# Patient Record
Sex: MALE | Race: WHITE | URBAN - METROPOLITAN AREA
[De-identification: names, ages, dates, MRNs, and addresses within clinical notes are randomized per-mention and may not be internally consistent; named-entity substitution may affect disease eponyms.]

---

## 2017-02-20 ENCOUNTER — ONCOLOGY VISIT (OUTPATIENT)
Dept: TRANSPLANT | Facility: CLINIC | Age: 32
End: 2017-02-20
Attending: INTERNAL MEDICINE
Payer: COMMERCIAL

## 2017-02-20 VITALS
HEART RATE: 98 BPM | OXYGEN SATURATION: 96 % | DIASTOLIC BLOOD PRESSURE: 89 MMHG | SYSTOLIC BLOOD PRESSURE: 136 MMHG | TEMPERATURE: 97.4 F | WEIGHT: 242.4 LBS | RESPIRATION RATE: 18 BRPM | BODY MASS INDEX: 36.86 KG/M2

## 2017-02-20 DIAGNOSIS — C92.01 ACUTE MYELOID LEUKEMIA IN REMISSION (H): ICD-10-CM

## 2017-02-20 DIAGNOSIS — I10 ESSENTIAL HYPERTENSION, BENIGN: ICD-10-CM

## 2017-02-20 LAB
ALBUMIN SERPL-MCNC: 3.9 G/DL (ref 3.4–5)
ALP SERPL-CCNC: 78 U/L (ref 40–150)
ALT SERPL W P-5'-P-CCNC: 100 U/L (ref 0–70)
ANION GAP SERPL CALCULATED.3IONS-SCNC: 12 MMOL/L (ref 3–14)
AST SERPL W P-5'-P-CCNC: 35 U/L (ref 0–45)
BASOPHILS # BLD AUTO: 0 10E9/L (ref 0–0.2)
BASOPHILS NFR BLD AUTO: 0.4 %
BILIRUB SERPL-MCNC: 0.5 MG/DL (ref 0.2–1.3)
BUN SERPL-MCNC: 10 MG/DL (ref 7–30)
CALCIUM SERPL-MCNC: 9.4 MG/DL (ref 8.5–10.1)
CHLORIDE SERPL-SCNC: 108 MMOL/L (ref 94–109)
CO2 SERPL-SCNC: 22 MMOL/L (ref 20–32)
CREAT SERPL-MCNC: 1.05 MG/DL (ref 0.66–1.25)
DIFFERENTIAL METHOD BLD: ABNORMAL
EOSINOPHIL # BLD AUTO: 0.1 10E9/L (ref 0–0.7)
EOSINOPHIL NFR BLD AUTO: 2.3 %
ERYTHROCYTE [DISTWIDTH] IN BLOOD BY AUTOMATED COUNT: 11.2 % (ref 10–15)
GFR SERPL CREATININE-BSD FRML MDRD: 83 ML/MIN/1.7M2
GLUCOSE SERPL-MCNC: 99 MG/DL (ref 70–99)
HCT VFR BLD AUTO: 46.3 % (ref 40–53)
HGB BLD-MCNC: 17 G/DL (ref 13.3–17.7)
IMM GRANULOCYTES # BLD: 0 10E9/L (ref 0–0.4)
IMM GRANULOCYTES NFR BLD: 0.4 %
LYMPHOCYTES # BLD AUTO: 2.7 10E9/L (ref 0.8–5.3)
LYMPHOCYTES NFR BLD AUTO: 47.6 %
MCH RBC QN AUTO: 33.6 PG (ref 26.5–33)
MCHC RBC AUTO-ENTMCNC: 36.7 G/DL (ref 31.5–36.5)
MCV RBC AUTO: 92 FL (ref 78–100)
MONOCYTES # BLD AUTO: 0.5 10E9/L (ref 0–1.3)
MONOCYTES NFR BLD AUTO: 9.3 %
NEUTROPHILS # BLD AUTO: 2.2 10E9/L (ref 1.6–8.3)
NEUTROPHILS NFR BLD AUTO: 40 %
NRBC # BLD AUTO: 0 10*3/UL
NRBC BLD AUTO-RTO: 0 /100
PLATELET # BLD AUTO: 326 10E9/L (ref 150–450)
POTASSIUM SERPL-SCNC: 3.8 MMOL/L (ref 3.4–5.3)
PROT SERPL-MCNC: 7.4 G/DL (ref 6.8–8.8)
RBC # BLD AUTO: 5.06 10E12/L (ref 4.4–5.9)
SODIUM SERPL-SCNC: 143 MMOL/L (ref 133–144)
WBC # BLD AUTO: 5.6 10E9/L (ref 4–11)

## 2017-02-20 PROCEDURE — 80053 COMPREHEN METABOLIC PANEL: CPT | Performed by: INTERNAL MEDICINE

## 2017-02-20 PROCEDURE — 99212 OFFICE O/P EST SF 10 MIN: CPT | Mod: ZF

## 2017-02-20 PROCEDURE — 36415 COLL VENOUS BLD VENIPUNCTURE: CPT

## 2017-02-20 PROCEDURE — 85025 COMPLETE CBC W/AUTO DIFF WBC: CPT | Performed by: INTERNAL MEDICINE

## 2017-02-20 NOTE — NURSING NOTE
BMT Heme Malignancy Rooming Note    Roel Alejandro - 2/20/2017 11:29 AM     Chief Complaint   Patient presents with     RECHECK     AML        /89 (BP Location: Right arm, Cuff Size: Adult Large)  Pulse 98  Temp 97.4  F (36.3  C) (Tympanic)  Resp 18  Wt 110 kg (242 lb 6.4 oz)  SpO2 96%  BMI 36.86 kg/m2     Medications reviewed: Yes    Labs drawn: No    Dressing changed: No     Medications given: No    Staff time:6    Additional information if applicable: n/a    HUMBLE VALLES CMA

## 2017-02-20 NOTE — PATIENT INSTRUCTIONS
Pt states he will schedule his own derm appt outside and he claims he doesn't need to come back here next week,lg

## 2017-02-20 NOTE — MR AVS SNAPSHOT
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  After hours call the   hospital at [phone] or   [phone]. Ask for the BMT   fellow for pediatric or adult patients           Care Instructions    Pt states he will schedule his own derm appt outside and he claims he doesn't need to come back here next week,lg        Follow-ups after your visit        Additional Services     Dermatology Referral       Your provider has referred you to: he ahs new nevi in the L arm, please eval    Please be aware that coverage of these services is subject to the terms and limitations of your health insurance plan.  Call member services at your health plan with any benefit or coverage questions.      Please bring the following with you to your appointment:    (1) Any X-Rays, CTs or MRIs which have been performed.  Contact the facility where they were done to arrange for  prior to your scheduled appointment.    (2) List of current medications  (3) This referral request   (4) Any documents/labs given to you for this referral                  Follow-up notes from your care team     Return in about 9 days (around 3/1/2017).      Your next 10 appointments already scheduled     Mar 15, 2017  1:30 PM T   [facility] Draw with  [facility]   [facility] Draw ([facility])    [address]  [facility]  [facility] [id number] [phone]            Mar 20, 2017  9:00 AM CDT   BMT Anniversary Visit with Chapo Mann MD   [facility] Blood and Marrow Transplant ([facility])    [address]  [facility] [facility] [id number] [phone]              Future tests that were ordered for you today     Open Future Orders        Priority Expected Expires Ordered    Dermatology Referral Routine 3/1/2017 2/20/2018 2/20/2017            Who to contact     If you have questions or need follow up information about today's clinic visit or your schedule please contact [facility] BLOOD AND MARROW TRANSPLANT directly at [phone].  Normal or non-critical lab and imaging results will be communicated to you by OleOlehart, letter or phone within 4 business days after the clinic has received the results. If you do not hear from us within 7 days, please contact the clinic through OleOlehart or phone. If you have a critical or abnormal lab result, we will notify you by phone as soon as possible.  Submit refill requests through citysocializer or call your pharmacy and they will forward the refill request to us. Please allow 3 business days for your refill to be completed.          Additional Information About Your Visit        citysocializer Information     citysocializer gives you secure access to your electronic health record. If you see a primary care provider, you can also send messages to your care team and make appointments. If you have questions, please call your primary care clinic.  If you do not have a primary care provider, please call [phone] and they will assist you.        Care EveryWhere ID     This is your Care EveryWhere ID. This could be used by other organizations to access your [facility] medical records  [id number]        Your Vitals Were     Pulse Temperature Respirations Pulse Oximetry BMI (Body Mass Index)       98 97.4  F (36.3  C) (Tympanic) 18 96% 36.86 kg/m2         Blood Pressure from Last 3 Encounters:   02/20/17 136/89   12/12/16 (!) 144/91   09/19/16 126/77    Weight from Last 3 Encounters:   02/20/17 110 kg (242 lb 6.4 oz)   12/12/16 111.3 kg (245 lb 4.8 oz)   09/19/16 107.9 kg (237 lb 14 oz)              We Performed the Following     CBC with platelets differential     Comprehensive metabolic panel          Today's Medication Changes          These changes are accurate as of: 2/20/17  3:33 PM.  If you have any questions, ask your nurse or doctor.               These medicines have changed or have updated prescriptions.        Dose/Directions    amLODIPine 5 MG tablet   Commonly known as:  NORVASC   This may have changed:  Another medication with the same name was removed. Continue taking this medication, and follow the directions you see here.   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), Essential hypertension, benign   Changed by:  Chapo Mann MD        Dose:  5 mg   Take 1 tablet (5 mg) by mouth daily   Quantity:  90 tablet   Refills:  3                Recent Review Flowsheet Data     BMT Recent Results Latest Ref Rng & Units 5/9/2016 6/20/2016 7/13/2016 8/8/2016 9/19/2016 12/12/2016 2/20/2017    WBC 4.0 - 11.0 10e9/L 4.3 4.9 9.1 4.3 4.5 4.9 5.6    Hemoglobin 13.3 - 17.7 g/dL 15.7 15.5 15.0 15.8 15.6 16.7 17.0    Platelet Count 150 - 450 10e9/L 239 234 272 226 258 252 326    Neutrophils (Absolute) 1.6 - 8.3 10e9/L 1.8 2.0 5.5 1.7 1.8 1.8 2.2    Sodium 133 - 144 mmol/L 141 138 139 140 141 140 143    Potassium 3.4 - 5.3 mmol/L 3.9 3.4 3.3(L) 3.6 3.5 3.7 3.8    Chloride 94 - 109 mmol/L 107 106 105 105 108 107 108    Glucose 70 - 99 mg/dL 85 112(H) 86 110(H) 95 119(H) 99    Urea Nitrogen 7 - 30 mg/dL 9 14 10 8 12 12 10    Creatinine 0.66 - 1.25 mg/dL 0.95 0.96 1.00 1.10 0.95 1.04 1.05    Calcium (Total) 8.5 - 10.1 mg/dL 9.3 9.0 9.4 9.4 9.2 9.5 9.4    Protein (Total) 6.8 - 8.8 g/dL 7.2 6.9 7.4 7.1 6.8 7.2 7.4    Albumin 3.4 - 5.0 g/dL 3.8 3.8 4.0 3.9  3.9 3.9 3.9    Alkaline Phosphatase 40 - 150 U/L 99 78 85 79 64 70 78    AST 0 - 45 U/L 35 40 36 43 37 52(H) 35    ALT 0 - 70 U/L 55 70 90(H) 101(H) 80(H) 104(H) 100(H)    MCV 78 - 100 fl 95 95 95 96 95 93 92               Primary Care Provider Office Phone # Fax #    Chapo Mann [phone]        [facility] [address] [facility] [address]  [facility] [address]        Thank you!     Thank you for choosing [facility] BLOOD AND MARROW TRANSPLANT  for your care. Our goal is always to provide you with excellent care. Hearing back from our patients is one way we can continue to improve our services. Please take a few minutes to complete the written survey that you may receive in the mail after your visit with us. Thank you!             Your Updated Medication List - Protect others around you: Learn how to safely use, store and throw away your medicines at www.disposemymeds.org.          This list is accurate as of: 2/20/17  3:33 PM.  Always use your most recent med list.                   Brand Name Dispense Instructions for use    amLODIPine 5 MG tablet    NORVASC    90 tablet    Take 1 tablet (5 mg) by mouth daily       busPIRone 15 MG tablet    BUSPAR    90 tablet    Take 0.5 tablets (7.5 mg) by mouth 2 times daily

## 2017-02-20 NOTE — NURSING NOTE
Chief Complaint   Patient presents with     Blood Draw     Labs only     Vitals and labs done peripherally.  See doc flow sheets for details.  Samanta Mccall CMA

## 2017-02-20 NOTE — PROGRESS NOTES
BMT Clinic Progress Note    Mr. Alejandro is a 29 yo man Day +636 s/p MA 8/8 allosib PBSCT for AML. Protocol 2001-02.     HPI:  HPI: He is doing fine.His oral intake is fine. Denied nausea/vomiting,diarrhea/constipation, cough/sputum, dyspnea, fever/chills, abdominal/chest pain, headache/lightheadness, His energy level is OK.       ROS: 10 point otherwise unremarkable other than what is noted in the HPI.     Physical Exam:  Blood pressure 136/89, pulse 98, temperature 97.4  F (36.3  C), temperature source Tympanic, resp. rate 18, weight 110 kg (242 lb 6.4 oz), SpO2 96 %.    General: A&O. NAD.  HEENT: ION. EOMI. Sclera anicteric; OP moist without lesions.  No erythema of posterior o/p.  No exudate.    Lungs: CTAB  Heart: RRR, no m/r/g  Abdomen: normoactive bs, soft, NT, ND  Extremities: no pedal edema  Skin: No rash.   Neurologic: Grossly nonfocal    Labs:  Lab Results   Component Value Date    WBC 5.6 02/20/2017    ANEU 2.2 02/20/2017    HGB 17.0 02/20/2017    HCT 46.3 02/20/2017     02/20/2017     02/20/2017    POTASSIUM 3.8 02/20/2017    CHLORIDE 108 02/20/2017    CO2 22 02/20/2017    GLC 99 02/20/2017    BUN 10 02/20/2017    CR 1.05 02/20/2017    MAG 1.8 01/11/2016    INR 1.03 06/09/2015   AST       43   8/8/2016  ALT      101   8/8/2016    Last Basic Metabolic Panel:  Lab Results   Component Value Date     02/20/2017      Lab Results   Component Value Date    POTASSIUM 3.8 02/20/2017     Lab Results   Component Value Date    CHLORIDE 108 02/20/2017     Lab Results   Component Value Date    ESPERANZA 9.4 02/20/2017     Lab Results   Component Value Date    CO2 22 02/20/2017     Lab Results   Component Value Date    BUN 10 02/20/2017     Lab Results   Component Value Date    CR 1.05 02/20/2017     Lab Results   Component Value Date    GLC 99 02/20/2017           1-YEAR ANNIVERSARY  FINAL DIAGNOSIS:  Bone marrow, posterior iliac crest, right decalcified trephine biopsy  and touch imprint; right  direct aspirate smear, and concentrated  aspirate smear; and peripheral blood smear:  - No morphologic or immunophenotypic evidence of acute myeloid  leukemia (see comment)  - Hypocellular bone marrow for age (20-30%) with trilineage process  and no increase in blasts  - Peripheral blood showing slight leukopenia; slight neutropenia   INTERPRETATION:  Bone marrow, Right:  No increase in myeloid blasts and no abnormal myeloid blast  population    COMMENT:  Final interpretation requires correlation with morphology and other  ancillary studies.    RESULTS:  Percentages reported below are based on the total number of CD45  positive viable leukocytes. If applicable, percentage of plasma cells is  from total viable nucleated cells.  1% cells in the blast gate (CD45 dim and low side scatter blast gate).  There is no aberrant immunophenotype on the myeloid blasts.  0.7% CD34 positive blasts  100% DONOR IN BM  Assessment/Plan: Mr. Alejandro is a 29 yo man ,  s/p MA 8/8 allosib PBSCT for AML. Protocol 2001-02. 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    1. AML/BMT:   - D+21, Day+100 bone marrow biopsy Show remission by morphology and flow. Chimerism 100% donor other then 87% donor in CD 3 compartment (previously 86% donor).   - D+180, and 360 bmbx , he is In CR byt CD3 PB is not 100% donor. Started tapering CSA on 9/21/15;     D/C BM biopsy at year 2, he really does not need it given his perfect CBC.    2. HEME: Keep Hgb>8g/dL and plts>30K (h/o retinal hemorrhage and h/o epistaxis). Benadryl/tylenol premeds. Not needing transfusions    3. ID: Afebrile, no active infections. CMV (+), HSV (-), EBV (+). CMV neg (8/28).  - PPx: D/c fluconzole given LFTs upper limit of normal. Decrease acyclovir to 800 mg BID from five times daily. D/c bactrium, give pentamidine today.   CMV was negative on 1/4/2016.  Pentamidin compelted  2nd set of immunisation done , last one in may 2017  4. GVHD: No active GVH. Off immunosuppressive  drugs.  ALT is still elvatd, but mild, other tests are normal, I explained about ETOH, sometime my perosnal experience ETOH can be unusually cause sig irritations, perhaps GVHD flares in the liver?    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 No evidence viral inclusions, CMV stains neg.   - Started steroids on 6/4 and has now tapered off, last day 8/15/15.   -  started tapering off 9/21/2015; completed    [12/21/2015, LFT elevation with plt count drifting down while on CSA 25 mg daily, so CSA dose was increased back to 25 mg po BID. Patient currently on 25 mg po BID, and doing well with this.  LFTs are within normal range, but are on upper limit of normal.  Therefore, will not taper CSA today.  Will have him continue on 25 mg po BID. 25 mg po BID and 25 QD alternatively (2/29/16); on CSA 25 mg daily  (3/2016); CSA 25 mg QOD (6/20/2016) started tapering off 9/21/2015; completed; D/C (8/8/16)  D/c fluc, D/c'd bactrim (give pentamidine today, last dose]  decreased acyclovir to 800 mg PO BID (from five times daily); D/C on 8/8/16    5. GI: ALT is slightly elevated, but better, just follow in 2 mos    - GI prophy: Protonix..  - h/o rectal abscess ' S/p recent surgery his pain is improved    6. FEN/Renal: WNL  - WNL    7. CV: Hypertension, he gained weight as well. Amlodipin was increased to 5 mg daily (12/12/2016)      RTC when anniversary is, D/C BM biopsy at 2 year, Ok to d other test, chimerism from PB    Chapo Mann MD

## 2017-03-15 DIAGNOSIS — C92.00 ACUTE MYELOID LEUKEMIA (H): Primary | ICD-10-CM

## 2017-03-15 LAB
ALBUMIN SERPL-MCNC: 3.8 G/DL (ref 3.4–5)
ALP SERPL-CCNC: 69 U/L (ref 40–150)
ALT SERPL W P-5'-P-CCNC: 84 U/L (ref 0–70)
ANION GAP SERPL CALCULATED.3IONS-SCNC: 8 MMOL/L (ref 3–14)
AST SERPL W P-5'-P-CCNC: 32 U/L (ref 0–45)
BASOPHILS # BLD AUTO: 0 10E9/L (ref 0–0.2)
BASOPHILS NFR BLD AUTO: 0.6 %
BILIRUB SERPL-MCNC: 0.5 MG/DL (ref 0.2–1.3)
BUN SERPL-MCNC: 9 MG/DL (ref 7–30)
CALCIUM SERPL-MCNC: 9 MG/DL (ref 8.5–10.1)
CHLORIDE SERPL-SCNC: 109 MMOL/L (ref 94–109)
CO2 SERPL-SCNC: 26 MMOL/L (ref 20–32)
CREAT SERPL-MCNC: 0.97 MG/DL (ref 0.66–1.25)
DIFFERENTIAL METHOD BLD: NORMAL
EOSINOPHIL # BLD AUTO: 0.1 10E9/L (ref 0–0.7)
EOSINOPHIL NFR BLD AUTO: 2.9 %
ERYTHROCYTE [DISTWIDTH] IN BLOOD BY AUTOMATED COUNT: 11.5 % (ref 10–15)
GFR SERPL CREATININE-BSD FRML MDRD: ABNORMAL ML/MIN/1.7M2
GLUCOSE SERPL-MCNC: 102 MG/DL (ref 70–99)
HCT VFR BLD AUTO: 45.5 % (ref 40–53)
HGB BLD-MCNC: 16.6 G/DL (ref 13.3–17.7)
IMM GRANULOCYTES # BLD: 0 10E9/L (ref 0–0.4)
IMM GRANULOCYTES NFR BLD: 0.4 %
LYMPHOCYTES # BLD AUTO: 2.2 10E9/L (ref 0.8–5.3)
LYMPHOCYTES NFR BLD AUTO: 46.2 %
MCH RBC QN AUTO: 32.9 PG (ref 26.5–33)
MCHC RBC AUTO-ENTMCNC: 36.5 G/DL (ref 31.5–36.5)
MCV RBC AUTO: 90 FL (ref 78–100)
MONOCYTES # BLD AUTO: 0.5 10E9/L (ref 0–1.3)
MONOCYTES NFR BLD AUTO: 9.5 %
NEUTROPHILS # BLD AUTO: 2 10E9/L (ref 1.6–8.3)
NEUTROPHILS NFR BLD AUTO: 40.4 %
NRBC # BLD AUTO: 0 10*3/UL
NRBC BLD AUTO-RTO: 0 /100
PLATELET # BLD AUTO: 258 10E9/L (ref 150–450)
POTASSIUM SERPL-SCNC: 3.7 MMOL/L (ref 3.4–5.3)
PROT SERPL-MCNC: 7 G/DL (ref 6.8–8.8)
RBC # BLD AUTO: 5.04 10E12/L (ref 4.4–5.9)
SODIUM SERPL-SCNC: 142 MMOL/L (ref 133–144)
WBC # BLD AUTO: 4.8 10E9/L (ref 4–11)

## 2017-03-15 PROCEDURE — 85025 COMPLETE CBC W/AUTO DIFF WBC: CPT | Performed by: INTERNAL MEDICINE

## 2017-03-15 PROCEDURE — 80053 COMPREHEN METABOLIC PANEL: CPT | Performed by: INTERNAL MEDICINE

## 2017-03-15 PROCEDURE — 81268 CHIMERISM ANAL W/CELL SELECT: CPT | Performed by: INTERNAL MEDICINE

## 2017-03-15 NOTE — NURSING NOTE
Chief Complaint   Patient presents with     Lab Only     patient here with AML - here for labs only for 2 year post transplant   labs drawn peripherally from left arm

## 2017-03-17 LAB
COPATH REPORT: NORMAL
COPATH REPORT: NORMAL

## 2017-03-20 ENCOUNTER — OFFICE VISIT (OUTPATIENT)
Dept: TRANSPLANT | Facility: CLINIC | Age: 32
End: 2017-03-20
Attending: INTERNAL MEDICINE
Payer: COMMERCIAL

## 2017-03-20 VITALS
BODY MASS INDEX: 37.68 KG/M2 | HEART RATE: 82 BPM | DIASTOLIC BLOOD PRESSURE: 96 MMHG | WEIGHT: 247.8 LBS | SYSTOLIC BLOOD PRESSURE: 145 MMHG | TEMPERATURE: 98.1 F | OXYGEN SATURATION: 96 % | RESPIRATION RATE: 20 BRPM

## 2017-03-20 DIAGNOSIS — C92.01 ACUTE MYELOID LEUKEMIA IN REMISSION (H): Primary | ICD-10-CM

## 2017-03-20 PROCEDURE — 99212 OFFICE O/P EST SF 10 MIN: CPT | Mod: ZF

## 2017-03-20 ASSESSMENT — PAIN SCALES - GENERAL: PAINLEVEL: NO PAIN (0)

## 2017-03-20 NOTE — MR AVS SNAPSHOT
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 Center ([facility])  [address] [place] [address]  Phone: [phone]  Clinic Hours:   Monday-Friday:    8am to 4:30pm   Weekends and holidays:    8am to noon (in general)  If your fever is 100.5  or greater,   call the clinic.  After hours call the   hospital at [phone] or   [phone]. Ask for the BMT   fellow for pediatric or adult patients           Care Instructions    5/1/17 @ 11a        Follow-ups after your visit        Additional Services     GASTROENTEROLOGY ADULT REF CONSULT ONLY       Preferred Location: [facility]      Please be aware that coverage of these services is subject to the terms and limitations of your health insurance plan.  Call member services at your health plan with any benefit or coverage questions.  Any procedures must be performed at a [place] facility OR coordinated by your clinic's referral office.    Please bring the following with you to your appointment:    (1) Any X-Rays, CTs or MRIs which have been performed.  Contact the facility where they were done to arrange for  prior to your scheduled appointment.    (2) List of current medications   (3) This referral request   (4) Any documents/labs given to you for this referral                  Follow-up notes from your care team     Return in about 6 weeks (around 5/1/2017) for Lab Work, Routine Visit.      Your next 10 appointments already scheduled     May 01, 2017 11:30 AM CDT   Return with Chapo Mann MD   [facility] Blood and Marrow Transplant ([facility])    [address] [place] [address]  [facility] [address] [phone]              Future tests that were ordered for you today     Open Future Orders        Priority Expected Expires Ordered    GASTROENTEROLOGY ADULT REF CONSULT ONLY Routine 5/1/2017 3/20/2018 3/20/2017    CBC with platelets differential Routine 5/1/2017 3/20/2018 3/20/2017    Comprehensive metabolic panel Routine 5/1/2017 3/20/2018 3/20/2017            Who to contact     If you have questions or need follow up information about today's clinic visit or your schedule please contact [facility] BLOOD AND MARROW TRANSPLANT directly at [phone].  Normal or non-critical lab and imaging results will be communicated to you by Placelinghart, letter or phone within 4 business days after the clinic has received the results. If you do not hear from us within 7 days, please contact the clinic through Bplatst or phone. If you have a critical or abnormal lab result, we will notify you by phone as soon as possible.  Submit refill requests through Fantastec or call your pharmacy and they will forward the refill request to us. Please allow 3 business days for your refill to be completed.          Additional Information About Your Visit        Placelinghart Information     Fantastec gives you secure access to your electronic health record. If you see a primary care provider, you can also send messages to your care team and make appointments. If you have questions, please call your primary care clinic.  If you do not have a primary care provider, please call [phone] and they will assist you.        Care EveryWhere ID     This is your Care EveryWhere ID. This could be used by other organizations to access your [facility] medical records  [id number]        Your Vitals Were     Pulse Temperature Respirations Pulse Oximetry BMI (Body Mass Index)       82 98.1  F (36.7  C) (Oral) 20 96% 37.68 kg/m2        Blood Pressure from Last 3 Encounters:   03/20/17 (!) 145/96   02/20/17 136/89   12/12/16 (!)  144/91    Weight from Last 3 Encounters:   03/20/17 112.4 kg (247 lb 12.8 oz)   02/20/17 110 kg (242 lb 6.4 oz)   12/12/16 111.3 kg (245 lb 4.8 oz)               Recent Review Flowsheet Data     BMT Recent Results Latest Ref Rng & Units 6/20/2016 7/13/2016 8/8/2016 9/19/2016 12/12/2016 2/20/2017 3/15/2017    WBC 4.0 - 11.0 10e9/L 4.9 9.1 4.3 4.5 4.9 5.6 4.8    Hemoglobin 13.3 - 17.7 g/dL 15.5 15.0 15.8 15.6 16.7 17.0 16.6    Platelet Count 150 - 450 10e9/L 234 272 226 258 252 326 258    Neutrophils (Absolute) 1.6 - 8.3 10e9/L 2.0 5.5 1.7 1.8 1.8 2.2 2.0    Sodium 133 - 144 mmol/L 138 139 140 141 140 143 142    Potassium 3.4 - 5.3 mmol/L 3.4 3.3(L) 3.6 3.5 3.7 3.8 3.7    Chloride 94 - 109 mmol/L 106 105 105 108 107 108 109    Glucose 70 - 99 mg/dL 112(H) 86 110(H) 95 119(H) 99 102(H)    Urea Nitrogen 7 - 30 mg/dL 14 10 8 12 12 10 9    Creatinine 0.66 - 1.25 mg/dL 0.96 1.00 1.10 0.95 1.04 1.05 0.97    Calcium (Total) 8.5 - 10.1 mg/dL 9.0 9.4 9.4 9.2 9.5 9.4 9.0    Protein (Total) 6.8 - 8.8 g/dL 6.9 7.4 7.1 6.8 7.2 7.4 7.0    Albumin 3.4 - 5.0 g/dL 3.8 4.0 3.9 3.9 3.9 3.9 3.8    Alkaline Phosphatase 40 - 150 U/L 78 85 79 64 70 78 69    AST 0 - 45 U/L 40 36 43 37 52(H) 35 32    ALT 0 - 70 U/L 70 90(H) 101(H) 80(H) 104(H) 100(H) 84(H)    MCV 78 - 100 fl 95 95 96 95 93 92 90               Primary Care Provider Office Phone # Fax #    Chapo Mann [phone]        PH[facility]IC[facility] [address] [facility] [address]  [facility] [address]        Thank you!     Thank you for choosing [facility] BLOOD AND MARROW TRANSPLANT  for your care. Our goal is always to provide you with excellent care. Hearing back from our patients is one way we can continue to improve our services. Please take a few minutes to complete the written survey that you may receive in the mail after your visit with us. Thank you!             Your Updated Medication List - Protect others around you: Learn how to safely use, store and throw away your  medicines at www.disposemymeds.org.          This list is accurate as of: 3/20/17  9:35 AM.  Always use your most recent med list.                   Brand Name Dispense Instructions for use    amLODIPine 5 MG tablet    NORVASC    90 tablet    Take 1 tablet (5 mg) by mouth daily       busPIRone 15 MG tablet    BUSPAR    90 tablet    Take 0.5 tablets (7.5 mg) by mouth 2 times daily

## 2017-03-20 NOTE — NURSING NOTE
BMT Heme Malignancy Rooming Note    Roel Alejandro - 3/20/2017 8:41 AM     Chief Complaint   Patient presents with     RECHECK     Patient with AML here for provider visit and lab review         BP (!) 145/96 (BP Location: Right arm, Patient Position: Chair, Cuff Size: Adult Large)  Pulse 82  Temp 98.1  F (36.7  C) (Oral)  Resp 20  Wt 112.4 kg (247 lb 12.8 oz)  SpO2 96%  BMI 37.68 kg/m2     Medications reviewed: Yes    Dressing changed: Not applicable     Medications given: No    Staff time:5 minutes     Additional information if applicable:      Thais Gregorio, SAMANTHA

## 2017-03-20 NOTE — PROGRESS NOTES
BMT Clinic Progress Note    Mr. Alejandro is a 29 yo man Day +734 s/p MA 8/8 allosib PBSCT for AML. Protocol 2001-02.     HPI:  No new symptoms, He is doing fine.His oral intake is fine. Denied nausea/vomiting,diarrhea/constipation, cough/sputum, dyspnea, fever/chills, abdominal/chest pain, headache/lightheadness, His energy level is OK.       ROS: 10 point otherwise unremarkable other than what is noted in the HPI.     Physical Exam:  Blood pressure (!) 145/96, pulse 82, temperature 98.1  F (36.7  C), temperature source Oral, resp. rate 20, weight 112.4 kg (247 lb 12.8 oz), SpO2 96 %.    General: A&O. NAD.  HEENT: ION. EOMI. Sclera anicteric; OP moist without lesions.  No erythema of posterior o/p.  No exudate.    Lungs: CTAB  Heart: RRR, no m/r/g  Abdomen: normoactive bs, soft, NT, ND  Extremities: no pedal edema  Skin: No rash.   Neurologic: Grossly nonfocal    Labs:    Lab Results   Component Value Date    WBC 4.8 03/15/2017     Lab Results   Component Value Date    RBC 5.04 03/15/2017     Lab Results   Component Value Date    HGB 16.6 03/15/2017     Lab Results   Component Value Date    HCT 45.5 03/15/2017     No components found for: MCT  Lab Results   Component Value Date    MCV 90 03/15/2017     Lab Results   Component Value Date    MCH 32.9 03/15/2017     Lab Results   Component Value Date    MCHC 36.5 03/15/2017     Lab Results   Component Value Date    RDW 11.5 03/15/2017     Lab Results   Component Value Date     03/15/2017     Last Basic Metabolic Panel:  Lab Results   Component Value Date     03/15/2017      Lab Results   Component Value Date    POTASSIUM 3.7 03/15/2017     Lab Results   Component Value Date    CHLORIDE 109 03/15/2017     Lab Results   Component Value Date    ESPERANZA 9.0 03/15/2017     Lab Results   Component Value Date    CO2 26 03/15/2017     Lab Results   Component Value Date    BUN 9 03/15/2017     Lab Results   Component Value Date    CR 0.97 03/15/2017     Lab  Results   Component Value Date     03/15/2017       1-YEAR ANNIVERSARY  FINAL DIAGNOSIS:  Bone marrow, posterior iliac crest, right decalcified trephine biopsy  and touch imprint; right direct aspirate smear, and concentrated  aspirate smear; and peripheral blood smear:  - No morphologic or immunophenotypic evidence of acute myeloid  leukemia (see comment)  - Hypocellular bone marrow for age (20-30%) with trilineage process  and no increase in blasts  - Peripheral blood showing slight leukopenia; slight neutropenia   INTERPRETATION:  Bone marrow, Right:  No increase in myeloid blasts and no abnormal myeloid blast  population    COMMENT:  Final interpretation requires correlation with morphology and other  ancillary studies.    RESULTS:  Percentages reported below are based on the total number of CD45  positive viable leukocytes. If applicable, percentage of plasma cells is  from total viable nucleated cells.  1% cells in the blast gate (CD45 dim and low side scatter blast gate).  There is no aberrant immunophenotype on the myeloid blasts.  0.7% CD34 positive blasts  100% DONOR IN BM  Assessment/Plan: Mr. Alejandro is a 31 yo man ,  s/p MA 8/8 allosib PBSCT for AML. Protocol 2001-02. 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    1. AML/BMT:   In CR, 100% donor PB)    2. HEME: Keep Hgb>8g/dL and plts>30K (h/o retinal hemorrhage and h/o epistaxis). Benadryl/tylenol premeds. Not needing transfusions    3. ID: Afebrile, no active infections. CMV (+), HSV (-), EBV (+). CMV neg (8/28).  - PPx: D/c fluconzole given LFTs upper limit of normal. Decrease acyclovir to 800 mg BID from five times daily. D/c bactrium, give pentamidine today.   CMV was negative on 1/4/2016.  Pentamidin compelted  2nd set of immunisation done , last one (3rd in may 2017)  4. GVHD: No active GVH. Off immunosuppressive drugs.  ALT is still elvatd, but mild, other tests are normal, I explained about ETOH, sometime my perosnal experience ETOH  can be unusually cause sig irritations, perhaps GVHD flares in the liver?    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 No evidence viral inclusions, CMV stains neg.   - Started steroids on 6/4 and has now tapered off, last day 8/15/15.   -  started tapering off 9/21/2015; completed    [12/21/2015, LFT elevation with plt count drifting down while on CSA 25 mg daily, so CSA dose was increased back to 25 mg po BID. Patient currently on 25 mg po BID, and doing well with this.  LFTs are within normal range, but are on upper limit of normal.  Therefore, will not taper CSA today.  Will have him continue on 25 mg po BID. 25 mg po BID and 25 QD alternatively (2/29/16); on CSA 25 mg daily  (3/2016); CSA 25 mg QOD (6/20/2016) started tapering off 9/21/2015; completed; D/C (8/8/16)  D/c fluc, D/c'd bactrim (give pentamidine today, last dose]  decreased acyclovir to 800 mg PO BID (from five times daily); D/C on 8/8/16    5. GI: ALT is slightly elevated, but better, just follow in 2 mos; see a GI phsicians he has some dyspepsia and loose stool    - GI prophy: Protonix..  - h/o rectal abscess ' S/p recent surgery his pain is improved    6. FEN/Renal: WNL  - WNL    7. CV: Hypertension, he gained weight as well. Amlodipin was increased to 5 mg daily (12/12/2016)      RTC in 6 weeks, GI consult as well  Chapo Mann MD

## 2017-05-15 ENCOUNTER — APPOINTMENT (OUTPATIENT)
Dept: LAB | Facility: CLINIC | Age: 32
End: 2017-05-15
Attending: INTERNAL MEDICINE
Payer: MEDICARE

## 2017-05-15 ENCOUNTER — ONCOLOGY VISIT (OUTPATIENT)
Dept: TRANSPLANT | Facility: CLINIC | Age: 32
End: 2017-05-15
Attending: INTERNAL MEDICINE
Payer: MEDICARE

## 2017-05-15 VITALS
RESPIRATION RATE: 16 BRPM | OXYGEN SATURATION: 96 % | TEMPERATURE: 97.8 F | HEART RATE: 93 BPM | WEIGHT: 240 LBS | DIASTOLIC BLOOD PRESSURE: 85 MMHG | BODY MASS INDEX: 36.49 KG/M2 | SYSTOLIC BLOOD PRESSURE: 131 MMHG

## 2017-05-15 DIAGNOSIS — C92.01 ACUTE MYELOID LEUKEMIA IN REMISSION (H): ICD-10-CM

## 2017-05-15 LAB
ALBUMIN SERPL-MCNC: 3.9 G/DL (ref 3.4–5)
ALP SERPL-CCNC: 73 U/L (ref 40–150)
ALT SERPL W P-5'-P-CCNC: 75 U/L (ref 0–70)
ANION GAP SERPL CALCULATED.3IONS-SCNC: 10 MMOL/L (ref 3–14)
AST SERPL W P-5'-P-CCNC: 33 U/L (ref 0–45)
BASOPHILS # BLD AUTO: 0 10E9/L (ref 0–0.2)
BASOPHILS NFR BLD AUTO: 0.6 %
BILIRUB SERPL-MCNC: 0.4 MG/DL (ref 0.2–1.3)
BUN SERPL-MCNC: 10 MG/DL (ref 7–30)
CALCIUM SERPL-MCNC: 9.2 MG/DL (ref 8.5–10.1)
CHLORIDE SERPL-SCNC: 110 MMOL/L (ref 94–109)
CO2 SERPL-SCNC: 22 MMOL/L (ref 20–32)
CREAT SERPL-MCNC: 0.95 MG/DL (ref 0.66–1.25)
DIFFERENTIAL METHOD BLD: ABNORMAL
EOSINOPHIL # BLD AUTO: 0.1 10E9/L (ref 0–0.7)
EOSINOPHIL NFR BLD AUTO: 2.9 %
ERYTHROCYTE [DISTWIDTH] IN BLOOD BY AUTOMATED COUNT: 11.7 % (ref 10–15)
GFR SERPL CREATININE-BSD FRML MDRD: ABNORMAL ML/MIN/1.7M2
GLUCOSE SERPL-MCNC: 103 MG/DL (ref 70–99)
HCT VFR BLD AUTO: 45.8 % (ref 40–53)
HGB BLD-MCNC: 16.8 G/DL (ref 13.3–17.7)
IMM GRANULOCYTES # BLD: 0 10E9/L (ref 0–0.4)
IMM GRANULOCYTES NFR BLD: 0.2 %
LYMPHOCYTES # BLD AUTO: 2.2 10E9/L (ref 0.8–5.3)
LYMPHOCYTES NFR BLD AUTO: 46.1 %
MCH RBC QN AUTO: 33.5 PG (ref 26.5–33)
MCHC RBC AUTO-ENTMCNC: 36.7 G/DL (ref 31.5–36.5)
MCV RBC AUTO: 91 FL (ref 78–100)
MONOCYTES # BLD AUTO: 0.5 10E9/L (ref 0–1.3)
MONOCYTES NFR BLD AUTO: 11.1 %
NEUTROPHILS # BLD AUTO: 1.9 10E9/L (ref 1.6–8.3)
NEUTROPHILS NFR BLD AUTO: 39.1 %
NRBC # BLD AUTO: 0 10*3/UL
NRBC BLD AUTO-RTO: 0 /100
PLATELET # BLD AUTO: 247 10E9/L (ref 150–450)
POTASSIUM SERPL-SCNC: 3.7 MMOL/L (ref 3.4–5.3)
PROT SERPL-MCNC: 7.2 G/DL (ref 6.8–8.8)
RBC # BLD AUTO: 5.01 10E12/L (ref 4.4–5.9)
SODIUM SERPL-SCNC: 142 MMOL/L (ref 133–144)
WBC # BLD AUTO: 4.8 10E9/L (ref 4–11)

## 2017-05-15 PROCEDURE — 99212 OFFICE O/P EST SF 10 MIN: CPT | Mod: 25

## 2017-05-15 PROCEDURE — G0009 ADMIN PNEUMOCOCCAL VACCINE: HCPCS

## 2017-05-15 PROCEDURE — 36415 COLL VENOUS BLD VENIPUNCTURE: CPT

## 2017-05-15 PROCEDURE — 90648 HIB PRP-T VACCINE 4 DOSE IM: CPT | Mod: ZF | Performed by: INTERNAL MEDICINE

## 2017-05-15 PROCEDURE — 90707 MMR VACCINE SC: CPT | Mod: ZF | Performed by: INTERNAL MEDICINE

## 2017-05-15 PROCEDURE — 90723 DTAP-HEP B-IPV VACCINE IM: CPT | Mod: ZF | Performed by: INTERNAL MEDICINE

## 2017-05-15 PROCEDURE — 85025 COMPLETE CBC W/AUTO DIFF WBC: CPT | Performed by: INTERNAL MEDICINE

## 2017-05-15 PROCEDURE — 80053 COMPREHEN METABOLIC PANEL: CPT | Performed by: INTERNAL MEDICINE

## 2017-05-15 PROCEDURE — 90472 IMMUNIZATION ADMIN EACH ADD: CPT

## 2017-05-15 PROCEDURE — 90716 VAR VACCINE LIVE SUBQ: CPT | Mod: ZF | Performed by: INTERNAL MEDICINE

## 2017-05-15 PROCEDURE — 25000134 H RX MED IP 250 OP 636 PS 250: Mod: ZF | Performed by: INTERNAL MEDICINE

## 2017-05-15 PROCEDURE — 25000125 ZZHC RX 250: Mod: ZF | Performed by: INTERNAL MEDICINE

## 2017-05-15 PROCEDURE — 25000128 H RX IP 250 OP 636: Mod: ZF | Performed by: INTERNAL MEDICINE

## 2017-05-15 PROCEDURE — G0008 ADMIN INFLUENZA VIRUS VAC: HCPCS

## 2017-05-15 PROCEDURE — 90670 PCV13 VACCINE IM: CPT | Mod: ZF | Performed by: INTERNAL MEDICINE

## 2017-05-15 PROCEDURE — 99212 OFFICE O/P EST SF 10 MIN: CPT | Mod: ZF

## 2017-05-15 RX ORDER — TRIAMCINOLONE ACETONIDE 1 MG/G
CREAM TOPICAL
Qty: 80 G | Refills: 0 | Status: SHIPPED | OUTPATIENT
Start: 2017-05-15 | End: 2017-09-25

## 2017-05-15 RX ADMIN — PNEUMOCOCCAL 13-VALENT CONJUGATE VACCINE 0.5 ML: 2.2; 2.2; 2.2; 2.2; 2.2; 4.4; 2.2; 2.2; 2.2; 2.2; 2.2; 2.2; 2.2 INJECTION, SUSPENSION INTRAMUSCULAR at 13:29

## 2017-05-15 RX ADMIN — DIPHTHERIA AND TETANUS TOXOIDS AND ACELLULAR PERTUSSIS ADSORBED, HEPATITIS B (RECOMBINANT) AND INACTIVATED POLIOVIRUS VACCINE COMBINED 0.5 ML: 25; 10; 25; 25; 8; 10; 40; 8; 32 INJECTION, SUSPENSION INTRAMUSCULAR at 13:28

## 2017-05-15 RX ADMIN — HAEMOPHILUS B POLYSACCHARIDE CONJUGATE VACCINE FOR INJ 0.5 ML: RECON SOLN at 13:27

## 2017-05-15 RX ADMIN — MEASLES, MUMPS, AND RUBELLA VIRUS VACCINE LIVE 0.5 ML: 1000; 12500; 1000 INJECTION, POWDER, LYOPHILIZED, FOR SUSPENSION SUBCUTANEOUS at 13:26

## 2017-05-15 RX ADMIN — VARICELLA VIRUS VACCINE LIVE 0.5 ML: 1350 INJECTION, POWDER, LYOPHILIZED, FOR SUSPENSION SUBCUTANEOUS at 13:25

## 2017-05-15 ASSESSMENT — PAIN SCALES - GENERAL: PAINLEVEL: NO PAIN (0)

## 2017-05-15 NOTE — NURSING NOTE
"Chief Complaint   Patient presents with     Blood Draw     labs collected from L arm venipuncuture, labs WDL      RECHECK     provider visit, post transplant AML     Oncology Rooming Note    May 15, 2017 12:28 PM   Roel Alejandro is a 31 year old male who presents for:    Chief Complaint   Patient presents with     Blood Draw     labs collected from L arm venipuncuture, labs WDL      RECHECK     provider visit, post transplant AML     Initial Vitals: /85  Pulse 93  Temp 97.8  F (36.6  C)  Resp 16  Wt 108.9 kg (240 lb)  SpO2 96%  BMI 36.49 kg/m2 Estimated body mass index is 36.49 kg/(m^2) as calculated from the following:    Height as of 7/21/16: 1.727 m (5' 8\").    Weight as of this encounter: 108.9 kg (240 lb). Body surface area is 2.29 meters squared.  No Pain (0) Comment: Data Unavailable   No LMP for male patient.  Allergies reviewed: Yes  Medications reviewed: Yes    Medications: Medication refills not needed today.  Pharmacy name entered into [facility]:    [facility] - [place], [place] - [id number]  [facility] [address] [place] [facility] - [place], [place] - [address] [place] [address] [address]  [facility][id number] - [address]    Clinical concerns: None     5 minutes for nursing intake (face to face time)     Ashley Velasquez RN              "

## 2017-05-15 NOTE — NURSING NOTE
Chief Complaint   Patient presents with     Blood Draw     labs collected from L arm venipuncuture, labs WDL      Ashley Negron RN

## 2017-05-15 NOTE — NURSING NOTE
Chief Complaint   Patient presents with     Blood Draw     labs collected from L arm venipuncuture, labs WDL      RECHECK     provider visit, post transplant AML       Gail Sellers,CMA May 15, 2017 1:53 PM  5 injections were given and tolerated well. See MAR.

## 2017-05-15 NOTE — MR AVS SNAPSHOT
After Visit Summary   5/15/2017    Roel Alejandro    MRN: [id number]           Patient Information     Date Of Birth          1985        Visit Information        Provider Department      5/15/2017 12:00 PM Chapo Mann MD [facility]        Today's Diagnoses     Acute myeloid leukemia in remission (H)              [facility] ([facility])  [address] [place] [address]  Phone: [phone]  Clinic Hours:   Monday-Thursday: 7am to 7pm   Friday: 7am to 5:30pm   Weekends and holidays:    8am to noon (in general)  If your fever is 100.5  or greater,   call the clinic.  After hours call the   hospital at [phone] or   [phone]. Ask for the BMT   fellow on-call            Follow-ups after your visit        Follow-up notes from your care team     Return in about 7 weeks (around 7/3/2017) for Lab Work, Routine Visit.      Your next 10 appointments already scheduled     Jul 10, 2017  9:30 AM CDT   [facility]  [facility]   [facility] ([facility])    [address] [place] [id number] [phone]            Jul 10, 2017 10:00 AM CDT   Return with Chapo Mann MD   [facility] ([facility])    [address] [place] [id number] [phone]              Future tests that were ordered for you today     Open Future Orders        Priority Expected Expires Ordered    Comprehensive metabolic panel Routine 7/3/2017 5/15/2018 5/15/2017    CBC with platelets differential Routine 7/3/2017 5/15/2018 5/15/2017            Who to contact     If you have questions or need follow up information about today's clinic visit or your schedule please contact [facility] directly at [phone].  Normal or non-critical lab and imaging results will be communicated to you by  MyChart, letter or phone within 4 business days after the clinic has received the results. If you do not hear from us within 7 days, please contact the clinic through [employer] or phone. If you have a critical or abnormal lab result, we will notify you by phone as soon as possible.  Submit refill requests through [employer] or call your pharmacy and they will forward the refill request to us. Please allow 3 business days for your refill to be completed.          Additional Information About Your Visit        [employer] Information     [employer] gives you secure access to your electronic health record. If you see a primary care provider, you can also send messages to your care team and make appointments. If you have questions, please call your primary care clinic.  If you do not have a primary care provider, please call [phone] and they will assist you.        Care EveryWhere ID     This is your Care EveryWhere ID. This could be used by other organizations to access your [facility] medical records  [id number]        Your Vitals Were     Pulse Temperature Respirations Pulse Oximetry BMI (Body Mass Index)       93 97.8  F (36.6  C) 16 96% 36.49 kg/m2        Blood Pressure from Last 3 Encounters:   05/15/17 131/85   03/20/17 (!) 145/96   02/20/17 136/89    Weight from Last 3 Encounters:   05/15/17 108.9 kg (240 lb)   03/20/17 112.4 kg (247 lb 12.8 oz)   02/20/17 110 kg (242 lb 6.4 oz)              We Performed the Following     CBC with platelets differential     Comprehensive metabolic panel          Today's Medication Changes          These changes are accurate as of: 5/15/17  1:40 PM.  If you have any questions, ask your nurse or doctor.               Start taking these medicines.        Dose/Directions    triamcinolone 0.1 % cream   Commonly known as:  KENALOG   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)   Started by:  Chapo Mann MD        Apply sparingly to affected area (forearms) three times daily as  needed   Quantity:  80 g   Refills:  0            Where to get your medicines      These medications were sent to [facility] [address] [place] [address] - [address]  [address], [facility] [address]     Phone:  [phone]     triamcinolone 0.1 % cream                Recent Review Flowsheet Data     BMT Recent Results Latest Ref Rng & Units 7/13/2016 8/8/2016 9/19/2016 12/12/2016 2/20/2017 3/15/2017 5/15/2017    WBC 4.0 - 11.0 10e9/L 9.1 4.3 4.5 4.9 5.6 4.8 4.8    Hemoglobin 13.3 - 17.7 g/dL 15.0 15.8 15.6 16.7 17.0 16.6 16.8    Platelet Count 150 - 450 10e9/L 272 226 258 252 326 258 247    Neutrophils (Absolute) 1.6 - 8.3 10e9/L 5.5 1.7 1.8 1.8 2.2 2.0 1.9    Sodium 133 - 144 mmol/L 139 140 141 140 143 142 142    Potassium 3.4 - 5.3 mmol/L 3.3(L) 3.6 3.5 3.7 3.8 3.7 3.7    Chloride 94 - 109 mmol/L 105 105 108 107 108 109 110(H)    Glucose 70 - 99 mg/dL 86 110(H) 95 119(H) 99 102(H) 103(H)    Urea Nitrogen 7 - 30 mg/dL 10 8 12 12 10 9 10    Creatinine 0.66 - 1.25 mg/dL 1.00 1.10 0.95 1.04 1.05 0.97 0.95    Calcium (Total) 8.5 - 10.1 mg/dL 9.4 9.4 9.2 9.5 9.4 9.0 9.2    Protein (Total) 6.8 - 8.8 g/dL 7.4 7.1 6.8 7.2 7.4 7.0 7.2    Albumin 3.4 - 5.0 g/dL 4.0 3.9 3.9 3.9 3.9 3.8 3.9    Alkaline Phosphatase 40 - 150 U/L 85 79 64 70 78 69 73    AST 0 - 45 U/L 36 43 37 52(H) 35 32 33    ALT 0 - 70 U/L 90(H) 101(H) 80(H) 104(H) 100(H) 84(H) 75(H)    MCV 78 - 100 fl 95 96 95 93 92 90 91               Primary Care Provider Office Phone # Fax #    Marielosmario MD Mack[place] [phone]        [facility] [address] [facility] [address]  [facility] [address]        Thank you!     Thank you for choosing [facility]  for your care. Our goal is always to provide you with excellent care. Hearing back from our patients is one way we can continue to improve our services. Please take a few minutes to complete the written survey that you may receive in the mail after your visit with  us. Thank you!             Your Updated Medication List - Protect others around you: Learn how to safely use, store and throw away your medicines at www.disposemymeds.org.          This list is accurate as of: 5/15/17  1:40 PM.  Always use your most recent med list.                   Brand Name Dispense Instructions for use    amLODIPine 5 MG tablet    NORVASC    90 tablet    Take 1 tablet (5 mg) by mouth daily       busPIRone 15 MG tablet    BUSPAR    90 tablet    Take 0.5 tablets (7.5 mg) by mouth 2 times daily       triamcinolone 0.1 % cream    KENALOG    80 g    Apply sparingly to affected area (forearms) three times daily as needed

## 2017-05-15 NOTE — PROGRESS NOTES
BMT Clinic Progress Note    Mr. Alejandro is a 31 yo man Day +790 s/p MA 8/8 allosib PBSCT for AML. Protocol 2001-02.     HPI:  Skin rash in forearm, when he takes benadryl it improves, on and off He is doing fine.His oral intake is fine. Denied nausea/vomiting,diarrhea/constipation, cough/sputum, dyspnea, fever/chills, abdominal/chest pain, headache/lightheadness, His energy level is OK.       ROS: 10 point otherwise unremarkable other than what is noted in the HPI.     Physical Exam:  /85  Pulse 93  Temp 97.8  F (36.6  C)  Resp 16  Wt 108.9 kg (240 lb)  SpO2 96%  BMI 36.49 kg/m2    General: A&O. NAD.  HEENT: ION. EOMI. Sclera anicteric; OP moist without lesions.  No erythema of posterior o/p.  No exudate.    Lungs: CTAB  Heart: RRR, no m/r/g  Abdomen: normoactive bs, soft, NT, ND  Extremities: no pedal edema  Skin:  Maculopapular rash, in both forearms.   Neurologic: Grossly nonfocal    Labs:    Lab Results   Component Value Date    WBC 4.8 03/15/2017     Lab Results   Component Value Date    RBC 5.04 03/15/2017     Lab Results   Component Value Date    HGB 16.6 03/15/2017     Lab Results   Component Value Date    HCT 45.5 03/15/2017     No components found for: MCT  Lab Results   Component Value Date    MCV 90 03/15/2017     Lab Results   Component Value Date    MCH 32.9 03/15/2017     Lab Results   Component Value Date    MCHC 36.5 03/15/2017     Lab Results   Component Value Date    RDW 11.5 03/15/2017     Lab Results   Component Value Date     03/15/2017     Last Basic Metabolic Panel:  Lab Results   Component Value Date     03/15/2017      Lab Results   Component Value Date    POTASSIUM 3.7 03/15/2017     Lab Results   Component Value Date    CHLORIDE 109 03/15/2017     Lab Results   Component Value Date    ESPERANZA 9.0 03/15/2017     Lab Results   Component Value Date    CO2 26 03/15/2017     Lab Results   Component Value Date    BUN 9 03/15/2017     Lab Results   Component Value  Date    CR 0.97 03/15/2017     Lab Results   Component Value Date     03/15/2017       1-YEAR ANNIVERSARY  FINAL DIAGNOSIS:  Bone marrow, posterior iliac crest, right decalcified trephine biopsy  and touch imprint; right direct aspirate smear, and concentrated  aspirate smear; and peripheral blood smear:  - No morphologic or immunophenotypic evidence of acute myeloid  leukemia (see comment)  - Hypocellular bone marrow for age (20-30%) with trilineage process  and no increase in blasts  - Peripheral blood showing slight leukopenia; slight neutropenia   INTERPRETATION:  Bone marrow, Right:  No increase in myeloid blasts and no abnormal myeloid blast  population    COMMENT:  Final interpretation requires correlation with morphology and other  ancillary studies.    RESULTS:  Percentages reported below are based on the total number of CD45  positive viable leukocytes. If applicable, percentage of plasma cells is  from total viable nucleated cells.  1% cells in the blast gate (CD45 dim and low side scatter blast gate).  There is no aberrant immunophenotype on the myeloid blasts.  0.7% CD34 positive blasts  100% DONOR IN BM  Assessment/Plan: Mr. Alejandro is a 31 yo man ,  s/p MA 8/8 allosib PBSCT for AML. Protocol 2001-02. 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    1. AML/BMT:   In CR, 100% donor PB)    2. HEME: Keep Hgb>8g/dL and plts>30K (h/o retinal hemorrhage and h/o epistaxis). Benadryl/tylenol premeds. Not needing transfusions    3. ID: Afebrile, no active infections. CMV (+), HSV (-), EBV (+). CMV neg (8/28).  - PPx: D/c fluconzole given LFTs upper limit of normal. Decrease acyclovir to 800 mg BID from five times daily. D/c bactrium, give pentamidine today.   CMV was negative on 1/4/2016.  Pentamidin compelted  2nd set of immunisation done , last one (3rd in may 15 2017)  4. GVHD: No active GVH. Off immunosuppressive drugs.  Skin rash perhaps due to sun exposure, triamsinolone 0.1 cream was  provided.    ALT is still elvatd, but mild, other tests are normal, I explained about ETOH, sometime my perosnal experience ETOH can be unusually cause sig irritations, perhaps GVHD flares in the liver?    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 No evidence viral inclusions, CMV stains neg.   - Started steroids on 6/4 and has now tapered off, last day 8/15/15.   -  started tapering off 9/21/2015; completed    [12/21/2015, LFT elevation with plt count drifting down while on CSA 25 mg daily, so CSA dose was increased back to 25 mg po BID. Patient currently on 25 mg po BID, and doing well with this.  LFTs are within normal range, but are on upper limit of normal.  Therefore, will not taper CSA today.  Will have him continue on 25 mg po BID. 25 mg po BID and 25 QD alternatively (2/29/16); on CSA 25 mg daily  (3/2016); CSA 25 mg QOD (6/20/2016) started tapering off 9/21/2015; completed; D/C (8/8/16)  D/c fluc, D/c'd bactrim (give pentamidine today, last dose]  decreased acyclovir to 800 mg PO BID (from five times daily); D/C on 8/8/16    5. GI: ALT is slightly elevated, but better, just follow in 2 mos; see a GI phsicians he has some dyspepsia and loose stool    - GI prophy: Protonix..  - h/o rectal abscess ' S/p recent surgery his pain is improved    6. FEN/Renal: WNL  - WNL    7. CV: Hypertension, he gained weight as well. Amlodipin was increased to 5 mg daily (12/12/2016), BP is better      RTC in 6 weeks  Chapo Mann MD

## 2017-07-10 ENCOUNTER — APPOINTMENT (OUTPATIENT)
Dept: LAB | Facility: CLINIC | Age: 32
End: 2017-07-10
Attending: INTERNAL MEDICINE
Payer: MEDICARE

## 2017-07-10 ENCOUNTER — ONCOLOGY VISIT (OUTPATIENT)
Dept: TRANSPLANT | Facility: CLINIC | Age: 32
End: 2017-07-10
Attending: INTERNAL MEDICINE
Payer: MEDICARE

## 2017-07-10 VITALS
TEMPERATURE: 98.4 F | OXYGEN SATURATION: 95 % | RESPIRATION RATE: 16 BRPM | WEIGHT: 240.7 LBS | SYSTOLIC BLOOD PRESSURE: 126 MMHG | HEART RATE: 84 BPM | DIASTOLIC BLOOD PRESSURE: 86 MMHG | BODY MASS INDEX: 36.6 KG/M2

## 2017-07-10 DIAGNOSIS — C92.01 ACUTE MYELOID LEUKEMIA IN REMISSION (H): ICD-10-CM

## 2017-07-10 LAB
ALBUMIN SERPL-MCNC: 3.8 G/DL (ref 3.4–5)
ALP SERPL-CCNC: 69 U/L (ref 40–150)
ALT SERPL W P-5'-P-CCNC: 56 U/L (ref 0–70)
ANION GAP SERPL CALCULATED.3IONS-SCNC: 7 MMOL/L (ref 3–14)
AST SERPL W P-5'-P-CCNC: 30 U/L (ref 0–45)
BASOPHILS # BLD AUTO: 0 10E9/L (ref 0–0.2)
BASOPHILS NFR BLD AUTO: 0.6 %
BILIRUB SERPL-MCNC: 0.5 MG/DL (ref 0.2–1.3)
BUN SERPL-MCNC: 8 MG/DL (ref 7–30)
CALCIUM SERPL-MCNC: 8.8 MG/DL (ref 8.5–10.1)
CHLORIDE SERPL-SCNC: 110 MMOL/L (ref 94–109)
CO2 SERPL-SCNC: 24 MMOL/L (ref 20–32)
CREAT SERPL-MCNC: 0.98 MG/DL (ref 0.66–1.25)
DIFFERENTIAL METHOD BLD: ABNORMAL
EOSINOPHIL # BLD AUTO: 0.1 10E9/L (ref 0–0.7)
EOSINOPHIL NFR BLD AUTO: 2.9 %
ERYTHROCYTE [DISTWIDTH] IN BLOOD BY AUTOMATED COUNT: 11.8 % (ref 10–15)
GFR SERPL CREATININE-BSD FRML MDRD: 89 ML/MIN/1.7M2
GLUCOSE SERPL-MCNC: 96 MG/DL (ref 70–99)
HCT VFR BLD AUTO: 43.3 % (ref 40–53)
HGB BLD-MCNC: 15.6 G/DL (ref 13.3–17.7)
IMM GRANULOCYTES # BLD: 0 10E9/L (ref 0–0.4)
IMM GRANULOCYTES NFR BLD: 0.2 %
LYMPHOCYTES # BLD AUTO: 2 10E9/L (ref 0.8–5.3)
LYMPHOCYTES NFR BLD AUTO: 41.5 %
MCH RBC QN AUTO: 33.3 PG (ref 26.5–33)
MCHC RBC AUTO-ENTMCNC: 36 G/DL (ref 31.5–36.5)
MCV RBC AUTO: 93 FL (ref 78–100)
MONOCYTES # BLD AUTO: 0.5 10E9/L (ref 0–1.3)
MONOCYTES NFR BLD AUTO: 10.5 %
NEUTROPHILS # BLD AUTO: 2.1 10E9/L (ref 1.6–8.3)
NEUTROPHILS NFR BLD AUTO: 44.3 %
NRBC # BLD AUTO: 0 10*3/UL
NRBC BLD AUTO-RTO: 0 /100
PLATELET # BLD AUTO: 232 10E9/L (ref 150–450)
POTASSIUM SERPL-SCNC: 3.8 MMOL/L (ref 3.4–5.3)
PROT SERPL-MCNC: 6.8 G/DL (ref 6.8–8.8)
RBC # BLD AUTO: 4.68 10E12/L (ref 4.4–5.9)
SODIUM SERPL-SCNC: 141 MMOL/L (ref 133–144)
WBC # BLD AUTO: 4.8 10E9/L (ref 4–11)

## 2017-07-10 PROCEDURE — 80053 COMPREHEN METABOLIC PANEL: CPT | Performed by: INTERNAL MEDICINE

## 2017-07-10 PROCEDURE — 85025 COMPLETE CBC W/AUTO DIFF WBC: CPT | Performed by: INTERNAL MEDICINE

## 2017-07-10 PROCEDURE — 99212 OFFICE O/P EST SF 10 MIN: CPT

## 2017-07-10 PROCEDURE — 36415 COLL VENOUS BLD VENIPUNCTURE: CPT | Performed by: INTERNAL MEDICINE

## 2017-07-10 ASSESSMENT — PAIN SCALES - GENERAL: PAINLEVEL: NO PAIN (0)

## 2017-07-10 NOTE — NURSING NOTE
Chief Complaint   Patient presents with     Blood Draw     venipuncture     Vitals done and labs drawn, see flow sheets.  HUMBLE VALLES, CMA

## 2017-07-10 NOTE — MR AVS SNAPSHOT
After Visit Summary   7/10/2017    Roel Alejandro    MRN: [id number]           Patient Information     Date Of Birth          1985        Visit Information        Provider Department      7/10/2017 10:00 AM Chapo Mann MD [facility] Blood and Marrow Transplant        Today's Diagnoses     Acute myeloid leukemia in remission ()              [facility] ([facility])  [address] [place] [address]  Phone: [phone]  Clinic Hours:   Monday-Thursday:7am to 7pm   Friday: 7am to 5pm   Weekends and holidays:    8am to noon (in general)  If your fever is 100.5  or greater,   call the clinic.  After hours call the   hospital at [phone] or   [phone]. Ask for the BMT   fellow on-call           Care Instructions    9/25 9am labs and visit          Follow-ups after your visit        Follow-up notes from your care team     Return in about 3 months (around 9/25/2017) for Lab Work, Routine Visit.      Your next 10 appointments already scheduled     Sep 25, 2017  9:00 AM CDT   [facility] with  [facility]   [facility] ([facility])    [address] [place] [id number] [phone]            Sep 25, 2017  9:30 AM CDT   Return with Chapo Mann MD   [facility] Blood and Marrow Transplant ([facility])    [address] [place] [id number] [phone]              Future tests that were ordered for you today     Open Future Orders        Priority Expected Expires Ordered    CBC with platelets differential Routine 9/25/2017 7/10/2018 7/10/2017    Comprehensive metabolic panel Routine 9/25/2017 7/10/2018 7/10/2017            Who to contact     If you have questions or need follow up information about today's clinic visit or your schedule please contact [facility] BLOOD AND MARROW TRANSPLANT directly at [phone].  Normal or non-critical lab  and imaging results will be communicated to you by Kaposthart, letter or phone within 4 business days after the clinic has received the results. If you do not hear from us within 7 days, please contact the clinic through "[employer] or phone. If you have a critical or abnormal lab result, we will notify you by phone as soon as possible.  Submit refill requests through "[employer] or call your pharmacy and they will forward the refill request to us. Please allow 3 business days for your refill to be completed.          Additional Information About Your Visit        [employer]ap[employer]agin Information     "[employer] gives you secure access to your electronic health record. If you see a primary care provider, you can also send messages to your care team and make appointments. If you have questions, please call your primary care clinic.  If you do not have a primary care provider, please call [phone] and they will assist you.        Care EveryWhere ID     This is your Care EveryWhere ID. This could be used by other organizations to access your [facility] medical records  [id number]        Your Vitals Were     Pulse Temperature Respirations Pulse Oximetry BMI (Body Mass Index)       84 98.4  F (36.9  C) (Oral) 16 95% 36.6 kg/m2        Blood Pressure from Last 3 Encounters:   07/10/17 126/86   05/15/17 131/85   03/20/17 (!) 145/96    Weight from Last 3 Encounters:   07/10/17 109.2 kg (240 lb 11.2 oz)   05/15/17 108.9 kg (240 lb)   03/20/17 112.4 kg (247 lb 12.8 oz)              We Performed the Following     CBC with platelets differential     Comprehensive metabolic panel        Recent Review Flowsheet Data     BMT Recent Results Latest Ref Rng & Units 8/8/2016 9/19/2016 12/12/2016 2/20/2017 3/15/2017 5/15/2017 7/10/2017    WBC 4.0 - 11.0 10e9/L 4.3 4.5 4.9 5.6 4.8 4.8 4.8    Hemoglobin 13.3 - 17.7 g/dL 15.8 15.6 16.7 17.0 16.6 16.8 15.6    Platelet Count 150 - 450 10e9/L 226 258 252 326 258 247 232    Neutrophils (Absolute) 1.6 - 8.3 10e9/L 1.7  1.8 1.8 2.2 2.0 1.9 2.1    Sodium 133 - 144 mmol/L 140 141 140 143 142 142 141    Potassium 3.4 - 5.3 mmol/L 3.6 3.5 3.7 3.8 3.7 3.7 3.8    Chloride 94 - 109 mmol/L 105 108 107 108 109 110(H) 110(H)    Glucose 70 - 99 mg/dL 110(H) 95 119(H) 99 102(H) 103(H) 96    Urea Nitrogen 7 - 30 mg/dL 8 12 12 10 9 10 8    Creatinine 0.66 - 1.25 mg/dL 1.10 0.95 1.04 1.05 0.97 0.95 0.98    Calcium (Total) 8.5 - 10.1 mg/dL 9.4 9.2 9.5 9.4 9.0 9.2 8.8    Protein (Total) 6.8 - 8.8 g/dL 7.1 6.8 7.2 7.4 7.0 7.2 6.8    Albumin 3.4 - 5.0 g/dL 3.9 3.9 3.9 3.9 3.8 3.9 3.8    Alkaline Phosphatase 40 - 150 U/L 79 64 70 78 69 73 69    AST 0 - 45 U/L 43 37 52(H) 35 32 33 30    ALT 0 - 70 U/L 101(H) 80(H) 104(H) 100(H) 84(H) 75(H) 56    MCV 78 - 100 fl 96 95 93 92 90 91 93               Primary Care Provider Office Phone # Fax #    Chapo MD Mackenzie [phone]        [facility] [address] [place] [id number]        Equal Access to Services     [facility] KAROL AH: Hadii lori ku hadkingsley Socarmel, waaxda luqadaha, qaybta kaalmada ayan, milan sun. So [facility] [phone].    ATENCIÓN: Si habla español, tiene a mendoza disposición servicios gratuitos de asistencia lingüística. Llame al [phone].    We comply with applicable federal civil rights laws and [place] laws. We do not discriminate on the basis of race, color, national origin, age, disability sex, sexual orientation or gender identity.            Thank you!     Thank you for choosing [facility] BLOOD AND MARROW TRANSPLANT  for your care. Our goal is always to provide you with excellent care. Hearing back from our patients is one way we can continue to improve our services. Please take a few minutes to complete the written survey that you may receive in the mail after your visit with us. Thank you!             Your Updated Medication List - Protect others around you: Learn how to safely use, store and throw away your medicines at  www.disposemymeds.org.          This list is accurate as of: 7/10/17 10:38 AM.  Always use your most recent med list.                   Brand Name Dispense Instructions for use Diagnosis    amLODIPine 5 MG tablet    NORVASC    90 tablet    Take 1 tablet (5 mg) by mouth daily    Acute myeloid leukemia in remission (H), Essential hypertension, benign       busPIRone 15 MG tablet    BUSPAR    90 tablet    Take 0.5 tablets (7.5 mg) by mouth 2 times daily    Anxiety       triamcinolone 0.1 % cream    KENALOG    80 g    Apply sparingly to affected area (forearms) three times daily as needed    Acute myeloid leukemia in remission (H)

## 2017-07-10 NOTE — PROGRESS NOTES
BMT Clinic Progress Note    Mr. Alejandro is a 31 yo man 27 months s/p MA 8/8 allosib PBSCT for AML. Protocol 2001-02.     HPI:  Skin rash in both ankles- he wore a floyd, most likely caused the rash B, now reoslving, when he takes benadryl it improves, on and off He is doing fine.His oral intake is fine. Denied nausea/vomiting,diarrhea/constipation, cough/sputum, dyspnea, fever/chills, abdominal/chest pain, headache/lightheadness, His energy level is OK.       ROS: 10 point otherwise unremarkable other than what is noted in the HPI.     Physical Exam:  /86  Pulse 84  Temp 98.4  F (36.9  C) (Oral)  Resp 16  Wt 109.2 kg (240 lb 11.2 oz)  SpO2 95%  BMI 36.6 kg/m2    General: A&O. NAD.  HEENT: ION. EOMI. Sclera anicteric; OP moist without lesions.  No erythema of posterior o/p.  No exudate.    Lungs: CTAB  Heart: RRR, no m/r/g  Abdomen: normoactive bs, soft, NT, ND  Extremities: no pedal edema, miled maculr rash R>L  Skin:  Maculopapular rash, in both forearms.   Neurologic: Grossly nonfocal    Labs:  Lab Results   Component Value Date    WBC 4.8 07/10/2017     Lab Results   Component Value Date    RBC 4.68 07/10/2017     Lab Results   Component Value Date    HGB 15.6 07/10/2017     Lab Results   Component Value Date    HCT 43.3 07/10/2017     No components found for: MCT  Lab Results   Component Value Date    MCV 93 07/10/2017     Lab Results   Component Value Date    MCH 33.3 07/10/2017     Lab Results   Component Value Date    MCHC 36.0 07/10/2017     Lab Results   Component Value Date    RDW 11.8 07/10/2017     Lab Results   Component Value Date     07/10/2017       Last Basic Metabolic Panel:  Lab Results   Component Value Date     07/10/2017      Lab Results   Component Value Date    POTASSIUM 3.8 07/10/2017     Lab Results   Component Value Date    CHLORIDE 110 07/10/2017     Lab Results   Component Value Date    ESPERANZA 8.8 07/10/2017     Lab Results   Component Value Date    CO2 24  07/10/2017     Lab Results   Component Value Date    BUN 8 07/10/2017     Lab Results   Component Value Date    CR 0.98 07/10/2017     Lab Results   Component Value Date    GLC 96 07/10/2017           1-YEAR ANNIVERSARY  FINAL DIAGNOSIS:  Bone marrow, posterior iliac crest, right decalcified trephine biopsy  and touch imprint; right direct aspirate smear, and concentrated  aspirate smear; and peripheral blood smear:  - No morphologic or immunophenotypic evidence of acute myeloid  leukemia (see comment)  - Hypocellular bone marrow for age (20-30%) with trilineage process  and no increase in blasts  - Peripheral blood showing slight leukopenia; slight neutropenia   INTERPRETATION:  Bone marrow, Right:  No increase in myeloid blasts and no abnormal myeloid blast  population    COMMENT:  Final interpretation requires correlation with morphology and other  ancillary studies.    RESULTS:  Percentages reported below are based on the total number of CD45  positive viable leukocytes. If applicable, percentage of plasma cells is  from total viable nucleated cells.  1% cells in the blast gate (CD45 dim and low side scatter blast gate).  There is no aberrant immunophenotype on the myeloid blasts.  0.7% CD34 positive blasts  100% DONOR IN BM  Assessment/Plan: Mr. Alejandro is a 29 yo man ,  s/p MA 8/8 allosib PBSCT for AML. Protocol 2001-02. 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    1. AML/BMT:   In CR, 100% donor PB)    2. HEME: Keep Hgb>8g/dL and plts>30K (h/o retinal hemorrhage and h/o epistaxis). Benadryl/tylenol premeds. Not needing transfusions    3. ID: Afebrile, no active infections. CMV (+), HSV (-), EBV (+). CMV neg (8/28).  - PPx: D/c fluconzole given LFTs upper limit of normal. Decrease acyclovir to 800 mg BID from five times daily. D/c bactrium, give pentamidine today.   CMV was negative on 1/4/2016.  Pentamidin compelted  immunisation complete, last one done (3rd in may 15 2017)  4. GVHD: No active GVH. Off  immunosuppressive drugs.    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 No evidence viral inclusions, CMV stains neg.   - Started steroids on 6/4 and has now tapered off, last day 8/15/15.   -  started tapering off 9/21/2015; completed    [12/21/2015, LFT elevation with plt count drifting down while on CSA 25 mg daily, so CSA dose was increased back to 25 mg po BID. Patient currently on 25 mg po BID, and doing well with this.  LFTs are within normal range, but are on upper limit of normal.  Therefore, will not taper CSA today.  Will have him continue on 25 mg po BID. 25 mg po BID and 25 QD alternatively (2/29/16); on CSA 25 mg daily  (3/2016); CSA 25 mg QOD (6/20/2016) started tapering off 9/21/2015; completed; D/C (8/8/16)  D/c fluc, D/c'd bactrim (give pentamidine today, last dose]  decreased acyclovir to 800 mg PO BID (from five times daily); D/C on 8/8/16    5. GI: ALT is slightly elevated, but better, just follow in 2 mos; see a GI phsicians he has some dyspepsia and loose stool    - GI prophy: Protonix..  - h/o rectal abscess ' S/p recent surgery his pain is improved    6. FEN/Renal: WNL  - WNL    7. CV: Hypertension, he gained weight as well. On Amlodipin 5 mg daily (12/12/2016), BP is better      RTC in 10 weeks  Chapo Mann MD

## 2017-07-10 NOTE — NURSING NOTE
"Oncology Rooming Note    July 10, 2017 10:24 AM   Roel Alejandro is a 31 year old male who presents for:    Chief Complaint   Patient presents with     Blood Draw     venipuncture     RECHECK     S/P bMT for AML--here for labs and to see MD     Initial Vitals: /86  Pulse 84  Temp 98.4  F (36.9  C) (Oral)  Resp 16  Wt 109.2 kg (240 lb 11.2 oz)  SpO2 95%  BMI 36.6 kg/m2 Estimated body mass index is 36.6 kg/(m^2) as calculated from the following:    Height as of 7/21/16: 1.727 m (5' 8\").    Weight as of this encounter: 109.2 kg (240 lb 11.2 oz). Body surface area is 2.29 meters squared.  No Pain (0) Comment: Data Unavailable   No LMP for male patient.  Allergies reviewed: Yes  Medications reviewed: Yes    Medications: Medication refills not needed today.  Pharmacy name entered into [facility]:    [facility] - [place], [place] - [address]  [facility] [address] [place] [facility] [address] [place] [address] [address]  [id number] - [place], [place] - [address]  [facility] [address]    Clinical concerns: none     6 minutes for nursing intake (face to face time)     Julieta Richter MA              "

## 2017-09-25 ENCOUNTER — APPOINTMENT (OUTPATIENT)
Dept: LAB | Facility: CLINIC | Age: 32
End: 2017-09-25
Attending: INTERNAL MEDICINE
Payer: MEDICARE

## 2017-09-25 ENCOUNTER — ONCOLOGY VISIT (OUTPATIENT)
Dept: TRANSPLANT | Facility: CLINIC | Age: 32
End: 2017-09-25
Attending: INTERNAL MEDICINE
Payer: MEDICARE

## 2017-09-25 VITALS
RESPIRATION RATE: 16 BRPM | SYSTOLIC BLOOD PRESSURE: 134 MMHG | BODY MASS INDEX: 36.45 KG/M2 | WEIGHT: 239.7 LBS | DIASTOLIC BLOOD PRESSURE: 84 MMHG | TEMPERATURE: 98.2 F | OXYGEN SATURATION: 93 % | HEART RATE: 99 BPM

## 2017-09-25 DIAGNOSIS — C92.01 ACUTE MYELOID LEUKEMIA IN REMISSION (H): ICD-10-CM

## 2017-09-25 LAB
ALBUMIN SERPL-MCNC: 3.9 G/DL (ref 3.4–5)
ALP SERPL-CCNC: 84 U/L (ref 40–150)
ALT SERPL W P-5'-P-CCNC: 116 U/L (ref 0–70)
ANION GAP SERPL CALCULATED.3IONS-SCNC: 8 MMOL/L (ref 3–14)
AST SERPL W P-5'-P-CCNC: 51 U/L (ref 0–45)
BASOPHILS # BLD AUTO: 0 10E9/L (ref 0–0.2)
BASOPHILS NFR BLD AUTO: 0.8 %
BILIRUB SERPL-MCNC: 1.5 MG/DL (ref 0.2–1.3)
BUN SERPL-MCNC: 10 MG/DL (ref 7–30)
CALCIUM SERPL-MCNC: 9.2 MG/DL (ref 8.5–10.1)
CHLORIDE SERPL-SCNC: 106 MMOL/L (ref 94–109)
CO2 SERPL-SCNC: 24 MMOL/L (ref 20–32)
CREAT SERPL-MCNC: 1.12 MG/DL (ref 0.66–1.25)
DIFFERENTIAL METHOD BLD: ABNORMAL
EOSINOPHIL # BLD AUTO: 0.2 10E9/L (ref 0–0.7)
EOSINOPHIL NFR BLD AUTO: 3 %
ERYTHROCYTE [DISTWIDTH] IN BLOOD BY AUTOMATED COUNT: 11.6 % (ref 10–15)
GFR SERPL CREATININE-BSD FRML MDRD: 76 ML/MIN/1.7M2
GLUCOSE SERPL-MCNC: 129 MG/DL (ref 70–99)
HCT VFR BLD AUTO: 47.1 % (ref 40–53)
HGB BLD-MCNC: 17.3 G/DL (ref 13.3–17.7)
IMM GRANULOCYTES # BLD: 0 10E9/L (ref 0–0.4)
IMM GRANULOCYTES NFR BLD: 0.2 %
LYMPHOCYTES # BLD AUTO: 2.3 10E9/L (ref 0.8–5.3)
LYMPHOCYTES NFR BLD AUTO: 45.6 %
MCH RBC QN AUTO: 33.9 PG (ref 26.5–33)
MCHC RBC AUTO-ENTMCNC: 36.7 G/DL (ref 31.5–36.5)
MCV RBC AUTO: 92 FL (ref 78–100)
MONOCYTES # BLD AUTO: 0.5 10E9/L (ref 0–1.3)
MONOCYTES NFR BLD AUTO: 9.6 %
NEUTROPHILS # BLD AUTO: 2 10E9/L (ref 1.6–8.3)
NEUTROPHILS NFR BLD AUTO: 40.8 %
NRBC # BLD AUTO: 0 10*3/UL
NRBC BLD AUTO-RTO: 0 /100
PLATELET # BLD AUTO: 267 10E9/L (ref 150–450)
POTASSIUM SERPL-SCNC: 3.6 MMOL/L (ref 3.4–5.3)
PROT SERPL-MCNC: 7.5 G/DL (ref 6.8–8.8)
RBC # BLD AUTO: 5.11 10E12/L (ref 4.4–5.9)
SODIUM SERPL-SCNC: 138 MMOL/L (ref 133–144)
WBC # BLD AUTO: 5 10E9/L (ref 4–11)

## 2017-09-25 PROCEDURE — 25000128 H RX IP 250 OP 636: Mod: ZF | Performed by: INTERNAL MEDICINE

## 2017-09-25 PROCEDURE — 99212 OFFICE O/P EST SF 10 MIN: CPT | Mod: 25

## 2017-09-25 PROCEDURE — G0008 ADMIN INFLUENZA VIRUS VAC: HCPCS

## 2017-09-25 PROCEDURE — 36415 COLL VENOUS BLD VENIPUNCTURE: CPT

## 2017-09-25 PROCEDURE — 80053 COMPREHEN METABOLIC PANEL: CPT | Performed by: INTERNAL MEDICINE

## 2017-09-25 PROCEDURE — 85025 COMPLETE CBC W/AUTO DIFF WBC: CPT | Performed by: INTERNAL MEDICINE

## 2017-09-25 PROCEDURE — 90686 IIV4 VACC NO PRSV 0.5 ML IM: CPT | Mod: ZF | Performed by: INTERNAL MEDICINE

## 2017-09-25 PROCEDURE — 99212 OFFICE O/P EST SF 10 MIN: CPT | Mod: ZF

## 2017-09-25 RX ADMIN — INFLUENZA A VIRUS A/MICHIGAN/45/2015 X-275 (H1N1) ANTIGEN (FORMALDEHYDE INACTIVATED), INFLUENZA A VIRUS A/HONG KONG/4801/2014 X-263B (H3N2) ANTIGEN (FORMALDEHYDE INACTIVATED), INFLUENZA B VIRUS B/PHUKET/3073/2013 ANTIGEN (FORMALDEHYDE INACTIVATED), AND INFLUENZA B VIRUS B/BRISBANE/60/2008 ANTIGEN (FORMALDEHYDE INACTIVATED) 0.5 ML: 15; 15; 15; 15 INJECTION, SUSPENSION INTRAMUSCULAR at 12:46

## 2017-09-25 NOTE — NURSING NOTE
"FLU VACCINE QUESTIONNAIRE:  Ask the following questions of all parties who want influenza vaccination:     CONTRAINDICATIONS  1.  Is the patient age less than 6 months?  NO  2.  Has the person to be vaccinated ever had Guillain-[place] syndrome? NO  3.  Has the person to be vaccinated had the vaccine this year? NO  4.  Is the person to be vaccinated sick today? NO  5.  Does the person to be vaccinated have an allergy to eggs or a component of the vaccine? NO  6.  Has the person to vaccinated ever had a serious reaction to an influenza vaccination in the past? NO    If the answer to ALL of the above questions is \"No\", then please administer the influenza vaccine per the standard protocol.  If the patient answered \"Yes\" to questions 1 or 2, do not administer the vaccine. If the patient answered \"Yes\" to question 3, do not administer the vaccine unless the patient is a child receiving the vaccine in two doses. If the patient answered \"Yes\" to questions 4, 5, and/or 6, get additional details on sickness and/or reaction and refer to provider. If you have any questions regarding contraindications, please refer to the provider.                                                         INFLUENZA VACCINATION NOTE      Information sheet given to patient and questions answered.     Patient or representative refused vaccination.   Reason: NA    ORDERS: Give influenza Vaccine   Ordered by Dr. Mann on September 25, 2017    [ Do not give Influenza Vaccine due to contraindication or refusal ]    Candidate for Pneumovax? No    INDICATION FOR VACCINATION:  Anyone from 6 months of age or older.   "

## 2017-09-25 NOTE — NURSING NOTE
"Oncology Rooming Note    September 25, 2017 12:12 PM   Roel Alejandro is a 32 year old male who presents for:    Chief Complaint   Patient presents with     Blood Draw     vpt right arm, vitals taken      RECHECK     Provider visit, AML     Initial Vitals: /84  Pulse 99  Temp 98.2  F (36.8  C) (Oral)  Resp 16  Wt 108.7 kg (239 lb 11.2 oz)  SpO2 93%  BMI 36.45 kg/m2 Estimated body mass index is 36.45 kg/(m^2) as calculated from the following:    Height as of 7/21/16: 1.727 m (5' 8\").    Weight as of this encounter: 108.7 kg (239 lb 11.2 oz). Body surface area is 2.28 meters squared.  Data Unavailable Comment: Data Unavailable   No LMP for male patient.  Allergies reviewed: Yes  Medications reviewed: Yes    Medications: Medication refills not needed today.  Pharmacy name entered into EPIC:    [facility] - [place], [place] - [id number]  [facility] [address] [place] [facility] - [address]  [id number] - [address]  [facility] [place] [address] [address]    Clinical concerns: None    5 minutes for nursing intake (face to face time)     Adore Campbell RN              "

## 2017-09-25 NOTE — NURSING NOTE
Chief Complaint   Patient presents with     Blood Draw     vpt right arm, vitals taken      Mandy Ledezma CMA

## 2017-09-25 NOTE — PROGRESS NOTES
BMT Clinic Progress Note    Mr. Alejandro is a 31 yo man  s/p MA 8/8 allosib PBSCT for AML (3/2015). Protocol 2001-02.     HPI:  No new symtpms, energy, oral intake are good      ROS: 10 point otherwise unremarkable other than what is noted in the HPI.     Physical Exam:  There were no vitals taken for this visit.    General: A&O. NAD.  HEENT: ION. EOMI. Sclera anicteric; OP moist without lesions.  No erythema of posterior o/p.  No exudate.    Lungs: CTAB  Heart: RRR, no m/r/g  Abdomen: normoactive bs, soft, NT, ND  Extremities: no pedal edema, miled maculr rash R>L  Skin:  Maculopapular rash, in both forearms.   Neurologic: Grossly nonfocal    Labs:  Lab Results   Component Value Date    WBC 5.0 09/25/2017     Lab Results   Component Value Date    RBC 5.11 09/25/2017     Lab Results   Component Value Date    HGB 17.3 09/25/2017     Lab Results   Component Value Date    HCT 47.1 09/25/2017     No components found for: MCT  Lab Results   Component Value Date    MCV 92 09/25/2017     Lab Results   Component Value Date    MCH 33.9 09/25/2017     Lab Results   Component Value Date    MCHC 36.7 09/25/2017     Lab Results   Component Value Date    RDW 11.6 09/25/2017     Lab Results   Component Value Date     09/25/2017     Last Basic Metabolic Panel:  Lab Results   Component Value Date     09/25/2017      Lab Results   Component Value Date    POTASSIUM 3.6 09/25/2017     Lab Results   Component Value Date    CHLORIDE 106 09/25/2017     Lab Results   Component Value Date    ESPERANZA 9.2 09/25/2017     Lab Results   Component Value Date    CO2 24 09/25/2017     Lab Results   Component Value Date    BUN 10 09/25/2017     Lab Results   Component Value Date    CR 1.12 09/25/2017     Lab Results   Component Value Date     09/25/2017     Lab Results   Component Value Date    AST 51 09/25/2017     Lab Results   Component Value Date     09/25/2017         1-YEAR ANNIVERSARY  FINAL DIAGNOSIS:  Bone  marrow, posterior iliac crest, right decalcified trephine biopsy  and touch imprint; right direct aspirate smear, and concentrated  aspirate smear; and peripheral blood smear:  - No morphologic or immunophenotypic evidence of acute myeloid  leukemia (see comment)  - Hypocellular bone marrow for age (20-30%) with trilineage process  and no increase in blasts  - Peripheral blood showing slight leukopenia; slight neutropenia   INTERPRETATION:  Bone marrow, Right:  No increase in myeloid blasts and no abnormal myeloid blast  population    COMMENT:  Final interpretation requires correlation with morphology and other  ancillary studies.    RESULTS:  Percentages reported below are based on the total number of CD45  positive viable leukocytes. If applicable, percentage of plasma cells is  from total viable nucleated cells.  1% cells in the blast gate (CD45 dim and low side scatter blast gate).  There is no aberrant immunophenotype on the myeloid blasts.  0.7% CD34 positive blasts  100% DONOR IN BM  Assessment/Plan: Mr. Alejandro is a 31 yo man ,  s/p MA 8/8 allosib PBSCT for AML. Protocol 2001-02. 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    1. AML/BMT:   In CR, 100% donor PB)    2. HEME: Keep Hgb>8g/dL and plts>30K (h/o retinal hemorrhage and h/o epistaxis). Benadryl/tylenol premeds. Not needing transfusions    3. ID: Afebrile, no active infections. CMV (+), HSV (-), EBV (+). CMV neg (8/28).  - PPx: D/c fluconzole given LFTs upper limit of normal. Decrease acyclovir to 800 mg BID from five times daily. D/c bactrium, give pentamidine today.   CMV was negative on 1/4/2016.  Pentamidin compelted  immunisation complete, last one done (3rd in may 15 2017)  Influenza vaccine today    4. GVHD: No active GVH. Off immunosuppressive drugs.    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 No evidence viral inclusions, CMV stains neg.   - Started steroids on 6/4 and has now  tapered off, last day 8/15/15.   -  started tapering off 9/21/2015; completed    [12/21/2015, LFT elevation with plt count drifting down while on CSA 25 mg daily, so CSA dose was increased back to 25 mg po BID. Patient currently on 25 mg po BID, and doing well with this.  LFTs are within normal range, but are on upper limit of normal.  Therefore, will not taper CSA today.  Will have him continue on 25 mg po BID. 25 mg po BID and 25 QD alternatively (2/29/16); on CSA 25 mg daily  (3/2016); CSA 25 mg QOD (6/20/2016) started tapering off 9/21/2015; completed; D/C (8/8/16)  D/c fluc, D/c'd bactrim (give pentamidine today, last dose]  decreased acyclovir to 800 mg PO BID (from five times daily); D/C on 8/8/16    5. GI: ALT is slightly elevated, but better, just follow in 2 mos; see a GI phsicians he has some dyspepsia and loose stool    - GI prophy: Protonix..  - h/o rectal abscess ' S/p recent surgery his pain is improved    6. FEN/Renal: WNL  - WNL    7. CV: Hypertension, he gained weight as well. On Amlodipin 10 mg daily , BP is fine      RTC in 4  mos  Chapo Mann MD

## 2017-09-25 NOTE — MR AVS SNAPSHOT
After Visit Summary   9/25/2017    Roel Alejandro    MRN: [id number]           Patient Information     Date Of Birth          1985        Visit Information        Provider Department      9/25/2017 12:00 PM Chapo Mann MD 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  After hours call the   hospital at [phone] or   [phone]. Ask for the BMT   fellow on-call           Care Instructions    1/8 9am labs and           Follow-ups after your visit        Follow-up notes from your care team     Return in about 3 months (around 1/8/2018) for Lab Work, Routine Visit.      Your next 10 appointments already scheduled     Jan 08, 2018  9:30 AM CST   [facility] Lab Draw with  MAS[facility] LAB DRAW   [facility] Mason[facility] Lab Draw ([facility])    [address] [place] [id number] [phone]            Jan 08, 2018 10:00 AM CST   Return with Chapo Mann MD   [facility] Blood and Marrow Transplant ([facility])    [address] [place] [id number] [phone]              Future tests that were ordered for you today     Open Future Orders        Priority Expected Expires Ordered    Comprehensive metabolic panel Routine 1/8/2018 9/25/2018 9/25/2017    CBC with platelets differential Routine 1/8/2018 9/25/2018 9/25/2017            Who to contact     If you have questions or need follow up information about today's clinic visit or your schedule please contact [facility] BLOOD AND MARROW TRANSPLANT directly at [phone].  Normal or non-critical lab and  imaging results will be communicated to you by [employer], letter or phone within 4 business days after the clinic has received the results. If you do not hear from us within 7 days, please contact the clinic through [employer] or phone. If you have a critical or abnormal lab result, we will notify you by phone as soon as possible.  Submit refill requests through [employer] or call your pharmacy and they will forward the refill request to us. Please allow 3 business days for your refill to be completed.          Additional Information About Your Visit        [employer] Information     [employer] gives you secure access to your electronic health record. If you see a primary care provider, you can also send messages to your care team and make appointments. If you have questions, please call your primary care clinic.  If you do not have a primary care provider, please call [phone] and they will assist you.        Care EveryWhere ID     This is your Care EveryWhere ID. This could be used by other organizations to access your [place] medical records  [id number]        Your Vitals Were     Pulse Temperature Respirations Pulse Oximetry BMI (Body Mass Index)       99 98.2  F (36.8  C) (Oral) 16 93% 36.45 kg/m2        Blood Pressure from Last 3 Encounters:   09/25/17 134/84   07/10/17 126/86   05/15/17 131/85    Weight from Last 3 Encounters:   09/25/17 108.7 kg (239 lb 11.2 oz)   07/10/17 109.2 kg (240 lb 11.2 oz)   05/15/17 108.9 kg (240 lb)              We Performed the Following     CBC with platelets differential     Comprehensive metabolic panel        Recent Review Flowsheet Data     BMT Recent Results Latest Ref Rng & Units 9/19/2016 12/12/2016 2/20/2017 3/15/2017 5/15/2017 7/10/2017 9/25/2017    WBC 4.0 - 11.0 10e9/L 4.5 4.9 5.6 4.8 4.8 4.8 5.0    Hemoglobin 13.3 - 17.7 g/dL 15.6 16.7 17.0 16.6 16.8 15.6 17.3    Platelet Count 150 - 450 10e9/L 258 252 326 258 247 232 267    Neutrophils (Absolute) 1.6 - 8.3 10e9/L 1.8 1.8  2.2 2.0 1.9 2.1 2.0    INR 0.86 - 1.14 - - - - - - -    Sodium 133 - 144 mmol/L 141 140 143 142 142 141 138    Potassium 3.4 - 5.3 mmol/L 3.5 3.7 3.8 3.7 3.7 3.8 3.6    Chloride 94 - 109 mmol/L 108 107 108 109 110(H) 110(H) 106    Glucose 70 - 99 mg/dL 95 119(H) 99 102(H) 103(H) 96 129(H)    Urea Nitrogen 7 - 30 mg/dL 12 12 10 9 10 8 10    Creatinine 0.66 - 1.25 mg/dL 0.95 1.04 1.05 0.97 0.95 0.98 1.12    Calcium (Total) 8.5 - 10.1 mg/dL 9.2 9.5 9.4 9.0 9.2 8.8 9.2    Protein (Total) 6.8 - 8.8 g/dL 6.8 7.2 7.4 7.0 7.2 6.8 7.5    Albumin 3.4 - 5.0 g/dL 3.9 3.9 3.9 3.8 3.9 3.8 3.9    Bilirubin (Direct) 0.0 - 0.2 mg/dL - - - - - - -    Alkaline Phosphatase 40 - 150 U/L 64 70 78 69 73 69 84    AST 0 - 45 U/L 37 52(H) 35 32 33 30 51(H)    ALT 0 - 70 U/L 80(H) 104(H) 100(H) 84(H) 75(H) 56 116(H)    MCV 78 - 100 fl 95 93 92 90 91 93 92               Primary Care Provider Office Phone # Fax #    Chapo Mann [phone]       [address] [place] [address]        Equal Access to Services     KEEGAN ROBERSON AH: Hadgela Gallegos, rené santillan, qaybta kaalmada homerda, milan sun. [facility] [phone].    ATENCIÓN: Si habla español, tiene a mendoza disposición servicios gratuitos de asistencia lingüística. Sheldon castaneda [phone].    We comply with applicable federal civil rights laws and [place] laws. We do not discriminate on the basis of race, color, national origin, age, disability sex, sexual orientation or gender identity.            Thank you!     Thank you for choosing [facility] BLOOD AND MARROW TRANSPLANT  for your care. Our goal is always to provide you with excellent care. Hearing back from our patients is one way we can continue to improve our services. Please take a few minutes to complete the written survey that you may receive in the mail after your visit with us. Thank you!             Your Updated Medication List - Protect others  around you: Learn how to safely use, store and throw away your medicines at www.disposemymeds.org.          This list is accurate as of: 9/25/17 12:51 PM.  Always use your most recent med list.                   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

## 2017-10-05 ENCOUNTER — RECORDS - HEALTHEAST (OUTPATIENT)
Dept: ADMINISTRATIVE | Facility: OTHER | Age: 32
End: 2017-10-05

## 2017-10-20 ENCOUNTER — HOSPITAL ENCOUNTER (OUTPATIENT)
Dept: MRI IMAGING | Facility: CLINIC | Age: 32
Discharge: HOME | End: 2017-10-20

## 2017-10-20 DIAGNOSIS — R19.7 DIARRHEA: ICD-10-CM

## 2018-01-08 ENCOUNTER — ONCOLOGY VISIT (OUTPATIENT)
Dept: TRANSPLANT | Facility: CLINIC | Age: 33
End: 2018-01-08
Attending: INTERNAL MEDICINE
Payer: MEDICARE

## 2018-01-08 VITALS
DIASTOLIC BLOOD PRESSURE: 101 MMHG | OXYGEN SATURATION: 96 % | HEIGHT: 68 IN | HEART RATE: 99 BPM | SYSTOLIC BLOOD PRESSURE: 142 MMHG | TEMPERATURE: 98.2 F | WEIGHT: 242.2 LBS | RESPIRATION RATE: 18 BRPM | BODY MASS INDEX: 36.71 KG/M2

## 2018-01-08 DIAGNOSIS — C92.01 ACUTE MYELOID LEUKEMIA IN REMISSION (H): ICD-10-CM

## 2018-01-08 LAB
ALBUMIN SERPL-MCNC: 3.8 G/DL (ref 3.4–5)
ALP SERPL-CCNC: 76 U/L (ref 40–150)
ALT SERPL W P-5'-P-CCNC: 88 U/L (ref 0–70)
ANION GAP SERPL CALCULATED.3IONS-SCNC: 8 MMOL/L (ref 3–14)
AST SERPL W P-5'-P-CCNC: 37 U/L (ref 0–45)
BASOPHILS # BLD AUTO: 0 10E9/L (ref 0–0.2)
BASOPHILS NFR BLD AUTO: 0.5 %
BILIRUB SERPL-MCNC: 0.6 MG/DL (ref 0.2–1.3)
BUN SERPL-MCNC: 13 MG/DL (ref 7–30)
CALCIUM SERPL-MCNC: 9 MG/DL (ref 8.5–10.1)
CHLORIDE SERPL-SCNC: 107 MMOL/L (ref 94–109)
CO2 SERPL-SCNC: 24 MMOL/L (ref 20–32)
CREAT SERPL-MCNC: 0.96 MG/DL (ref 0.66–1.25)
DIFFERENTIAL METHOD BLD: ABNORMAL
EOSINOPHIL # BLD AUTO: 0.2 10E9/L (ref 0–0.7)
EOSINOPHIL NFR BLD AUTO: 3.1 %
ERYTHROCYTE [DISTWIDTH] IN BLOOD BY AUTOMATED COUNT: 11.1 % (ref 10–15)
GFR SERPL CREATININE-BSD FRML MDRD: >90 ML/MIN/1.7M2
GLUCOSE SERPL-MCNC: 134 MG/DL (ref 70–99)
HCT VFR BLD AUTO: 46.6 % (ref 40–53)
HGB BLD-MCNC: 16.9 G/DL (ref 13.3–17.7)
IMM GRANULOCYTES # BLD: 0 10E9/L (ref 0–0.4)
IMM GRANULOCYTES NFR BLD: 0.4 %
LYMPHOCYTES # BLD AUTO: 2.2 10E9/L (ref 0.8–5.3)
LYMPHOCYTES NFR BLD AUTO: 39.7 %
MCH RBC QN AUTO: 33.2 PG (ref 26.5–33)
MCHC RBC AUTO-ENTMCNC: 36.3 G/DL (ref 31.5–36.5)
MCV RBC AUTO: 92 FL (ref 78–100)
MONOCYTES # BLD AUTO: 0.4 10E9/L (ref 0–1.3)
MONOCYTES NFR BLD AUTO: 7.5 %
NEUTROPHILS # BLD AUTO: 2.7 10E9/L (ref 1.6–8.3)
NEUTROPHILS NFR BLD AUTO: 48.8 %
NRBC # BLD AUTO: 0 10*3/UL
NRBC BLD AUTO-RTO: 0 /100
PLATELET # BLD AUTO: 302 10E9/L (ref 150–450)
POTASSIUM SERPL-SCNC: 3.4 MMOL/L (ref 3.4–5.3)
PROT SERPL-MCNC: 7.4 G/DL (ref 6.8–8.8)
RBC # BLD AUTO: 5.09 10E12/L (ref 4.4–5.9)
SODIUM SERPL-SCNC: 138 MMOL/L (ref 133–144)
WBC # BLD AUTO: 5.5 10E9/L (ref 4–11)

## 2018-01-08 PROCEDURE — 80053 COMPREHEN METABOLIC PANEL: CPT | Performed by: INTERNAL MEDICINE

## 2018-01-08 PROCEDURE — 36415 COLL VENOUS BLD VENIPUNCTURE: CPT

## 2018-01-08 PROCEDURE — 85025 COMPLETE CBC W/AUTO DIFF WBC: CPT | Performed by: INTERNAL MEDICINE

## 2018-01-08 PROCEDURE — G0463 HOSPITAL OUTPT CLINIC VISIT: HCPCS | Mod: ZF

## 2018-01-08 ASSESSMENT — PAIN SCALES - GENERAL: PAINLEVEL: NO PAIN (0)

## 2018-01-08 NOTE — NURSING NOTE
"Oncology Rooming Note    January 8, 2018 10:24 AM   Roel Alejandro is a 32 year old male who presents for:    Chief Complaint   Patient presents with     RECHECK     Acute myeloid leukemia      Initial Vitals: BP (!) 142/101  Pulse 99  Temp 98.2  F (36.8  C) (Oral)  Resp 18  Ht 1.727 m (5' 7.99\")  Wt 109.9 kg (242 lb 3.2 oz)  SpO2 96%  BMI 36.84 kg/m2 Estimated body mass index is 36.84 kg/(m^2) as calculated from the following:    Height as of this encounter: 1.727 m (5' 7.99\").    Weight as of this encounter: 109.9 kg (242 lb 3.2 oz). Body surface area is 2.3 meters squared.  No Pain (0) Comment: Data Unavailable   No LMP for male patient.  Allergies reviewed: Yes  Medications reviewed: Yes    Medications: Medication refills not needed today.  Pharmacy name entered into [facility]:    [facility] - [place], [place] - [id number]  [facility] [address] [place] [facility] [address]  [id number] - [place], [place] - [facility] access [facility]  [facility] - [place] [address] [address]    Clinical concerns:  No new concerns  Provider was notified.    5 minutes for nursing intake (face to face time)     Audrey Fitzpatrick MA              "

## 2018-01-08 NOTE — MR AVS SNAPSHOT
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  After hours call the   hospital at [phone] or   [phone]. Ask for the BMT   fellow on-call            Follow-ups after your visit        Follow-up notes from your care team     Return in about 6 months (around 6/25/2018) for Lab Work, Routine Visit.      Your next 10 appointments already scheduled     Jul 09, 2018  9:00 AM CDT   [facility] Lab Draw with  [facility] LAB DRAW   [facility] Mason[facility] Lab Draw ([facility])    [address]  [address] [place] [id number] [phone]            Jul 09, 2018  9:30 AM CDT   Return with Chapo Mann MD   [facility] Blood and Marrow Transplant ([facility])    [address]  [address] [place] [id number] [phone]              Future tests that were ordered for you today     Open Future Orders        Priority Expected Expires Ordered    CBC with platelets differential Routine 6/25/2018 1/8/2019 1/8/2018    Comprehensive metabolic panel Routine 6/25/2018 1/8/2019 1/8/2018            Who to contact     If you have questions or need follow up information about today's clinic visit or your schedule please contact [facility] BLOOD AND MARROW TRANSPLANT directly at [phone].  Normal or non-critical lab and imaging results will be communicated to you by [employer]  "letter or phone within 4 business days after the clinic has received the results. If you do not hear from us within 7 days, please contact the clinic through [employer] or phone. If you have a critical or abnormal lab result, we will notify you by phone as soon as possible.  Submit refill requests through [employer] or call your pharmacy and they will forward the refill request to us. Please allow 3 business days for your refill to be completed.          Additional Information About Your Visit        [employer] Information     [employer] gives you secure access to your electronic health record. If you see a primary care provider, you can also send messages to your care team and make appointments. If you have questions, please call your primary care clinic.  If you do not have a primary care provider, please call [phone] and they will assist you.        Care EveryWhere ID     This is your Care EveryWhere ID. This could be used by other organizations to access your [place] medical records  [id number]        Your Vitals Were     Pulse Temperature Respirations Height Pulse Oximetry BMI (Body Mass Index)    99 98.2  F (36.8  C) (Oral) 18 1.727 m (5' 7.99\") 96% 36.84 kg/m2       Blood Pressure from Last 3 Encounters:   01/08/18 (!) 142/101   09/25/17 134/84   07/10/17 126/86    Weight from Last 3 Encounters:   01/08/18 109.9 kg (242 lb 3.2 oz)   09/25/17 108.7 kg (239 lb 11.2 oz)   07/10/17 109.2 kg (240 lb 11.2 oz)              We Performed the Following     CBC with platelets differential     Comprehensive metabolic panel          Today's Medication Changes          These changes are accurate as of: 1/8/18 10:33 AM.  If you have any questions, ask your nurse or doctor.               Stop taking these medicines if you haven't already. Please contact your care team if you have questions.     busPIRone 15 MG tablet   Commonly known as:  BUSPAR   Stopped by:  Chapo Mann MD                    Recent Review Flowsheet Data "     BMT Recent Results Latest Ref Rng & Units 12/12/2016 2/20/2017 3/15/2017 5/15/2017 7/10/2017 9/25/2017 1/8/2018    WBC 4.0 - 11.0 10e9/L 4.9 5.6 4.8 4.8 4.8 5.0 5.5    Hemoglobin 13.3 - 17.7 g/dL 16.7 17.0 16.6 16.8 15.6 17.3 16.9    Platelet Count 150 - 450 10e9/L 252 326 258 247 232 267 302    Neutrophils (Absolute) 1.6 - 8.3 10e9/L 1.8 2.2 2.0 1.9 2.1 2.0 2.7    INR 0.86 - 1.14 - - - - - - -    Sodium 133 - 144 mmol/L 140 143 142 142 141 138 138    Potassium 3.4 - 5.3 mmol/L 3.7 3.8 3.7 3.7 3.8 3.6 3.4    Chloride 94 - 109 mmol/L 107 108 109 110(H) 110(H) 106 107    Glucose 70 - 99 mg/dL 119(H) 99 102(H) 103(H) 96 129(H) 134(H)    Urea Nitrogen 7 - 30 mg/dL 12 10 9 10 8 10 13    Creatinine 0.66 - 1.25 mg/dL 1.04 1.05 0.97 0.95 0.98 1.12 0.96    Calcium (Total) 8.5 - 10.1 mg/dL 9.5 9.4 9.0 9.2 8.8 9.2 9.0    Protein (Total) 6.8 - 8.8 g/dL 7.2 7.4 7.0 7.2 6.8 7.5 7.4    Albumin 3.4 - 5.0 g/dL 3.9 3.9 3.8 3.9 3.8 3.9 3.8    Bilirubin (Direct) 0.0 - 0.2 mg/dL - - - - - - -    Alkaline Phosphatase 40 - 150 U/L 70 78 69 73 69 84 76    AST 0 - 45 U/L 52(H) 35 32 33 30 51(H) 37    ALT 0 - 70 U/L 104(H) 100(H) 84(H) 75(H) 56 116(H) 88(H)    MCV 78 - 100 fl 93 92 90 91 93 92 92               Primary Care Provider Office Phone # Fax #    Chapo Mann [phone]       [address] [place] [address]        Equal Access to Services     KEEGAN ROBERSON : Hadii lori ku hadasho Soomaali, waaxda luqadaha, qaybta kaalmada adeegyada, waxjasson fernando hayelien abida sun. So [facility] [phone].    ATENCIÓN: Si habla español, tiene a mendoza disposición servicios gratuitos de asistencia lingüística. Llame al [phone].    We comply with applicable federal civil rights laws and [place] laws. We do not discriminate on the basis of race, color, national origin, age, disability, sex, sexual orientation, or gender identity.            Thank you!     Thank you for choosing [facility] BLOOD AND MARROW  TRANSPLANT  for your care. Our goal is always to provide you with excellent care. Hearing back from our patients is one way we can continue to improve our services. Please take a few minutes to complete the written survey that you may receive in the mail after your visit with us. Thank you!             Your Updated Medication List - Protect others around you: Learn how to safely use, store and throw away your medicines at www.disposemymeds.org.          This list is accurate as of: 1/8/18 10:33 AM.  Always use your most recent med list.                   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

## 2018-01-08 NOTE — NURSING NOTE
Chief Complaint   Patient presents with     Blood Draw     Labs only     Vitals done, labs drawn by vpt.  See doc flow sheets for details.  Samanta Mccall CMA

## 2018-01-08 NOTE — PROGRESS NOTES
BMT Clinic Progress Note    Mr. 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 Protocol 2001-02.     HPI:  No new symtpms, energy, oral intake are good      ROS: 10 point otherwise unremarkable other than what is noted in the HPI.     Physical Exam:  There were no vitals taken for this visit.    General: A&O. NAD.  HEENT: ION. EOMI. Sclera anicteric; OP moist without lesions.  No erythema of posterior o/p.  No exudate.    Lungs: CTAB  Heart: RRR, no m/r/g  Abdomen: normoactive bs, soft, NT, ND  Extremities: no pedal edema, miled maculr rash R>L  Skin:  Maculopapular rash, in both forearms.   Neurologic: Grossly nonfocal    Labs:  Lab Results   Component Value Date    WBC 5.5 01/08/2018     Lab Results   Component Value Date    RBC 5.09 01/08/2018     Lab Results   Component Value Date    HGB 16.9 01/08/2018     Lab Results   Component Value Date    HCT 46.6 01/08/2018     No components found for: MCT  Lab Results   Component Value Date    MCV 92 01/08/2018     Lab Results   Component Value Date    MCH 33.2 01/08/2018     Lab Results   Component Value Date    MCHC 36.3 01/08/2018     Lab Results   Component Value Date    RDW 11.1 01/08/2018     Lab Results   Component Value Date     01/08/2018     Last Basic Metabolic Panel:  Lab Results   Component Value Date     01/08/2018      Lab Results   Component Value Date    POTASSIUM 3.4 01/08/2018     Lab Results   Component Value Date    CHLORIDE 107 01/08/2018     Lab Results   Component Value Date    ESPERANZA 9.0 01/08/2018     Lab Results   Component Value Date    CO2 24 01/08/2018     Lab Results   Component Value Date    BUN 13 01/08/2018     Lab Results   Component Value Date    CR 0.96 01/08/2018     Lab Results   Component Value Date     01/08/2018     Lab Results   Component Value Date    AST 37 01/08/2018     Lab Results   Component Value Date    ALT 88 01/08/2018         1-YEAR ANNIVERSARY  FINAL DIAGNOSIS:  Bone  marrow, posterior iliac crest, right decalcified trephine biopsy  and touch imprint; right direct aspirate smear, and concentrated  aspirate smear; and peripheral blood smear:  - No morphologic or immunophenotypic evidence of acute myeloid  leukemia (see comment)  - Hypocellular bone marrow for age (20-30%) with trilineage process  and no increase in blasts  - Peripheral blood showing slight leukopenia; slight neutropenia   INTERPRETATION:  Bone marrow, Right:  No increase in myeloid blasts and no abnormal myeloid blast  population    COMMENT:  Final interpretation requires correlation with morphology and other  ancillary studies.    RESULTS:  Percentages reported below are based on the total number of CD45  positive viable leukocytes. If applicable, percentage of plasma cells is  from total viable nucleated cells.  1% cells in the blast gate (CD45 dim and low side scatter blast gate).  There is no aberrant immunophenotype on the myeloid blasts.  0.7% CD34 positive blasts  100% DONOR IN BM  Assessment/Plan: Mr. Alejandro is a 29 yo man ,  s/p MA 8/8 allosib PBSCT for AML. Protocol 2001-02. 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    1. AML/BMT:   In CR, 100% donor PB)    2. HEME: Keep Hgb>8g/dL and plts>30K (h/o retinal hemorrhage and h/o epistaxis). Benadryl/tylenol premeds. Not needing transfusions    3. ID: Afebrile, no active infections. CMV (+), HSV (-), EBV (+). CMV neg (8/28).  - PPx: D/c fluconzole given LFTs upper limit of normal. Decrease acyclovir to 800 mg BID from five times daily. D/c bactrium, give pentamidine today.   CMV was negative on 1/4/2016.  Pentamidin compelted  immunisation complete, last one done (3rd in may 15 2017)  Influenza vaccine today    4. GVHD: No active GVH. Off immunosuppressive drugs.    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 No evidence viral inclusions, CMV stains neg.   - Started steroids on 6/4  and has now tapered off, last day 8/15/15.   -  started tapering off 9/21/2015; completed    [12/21/2015, LFT elevation with plt count drifting down while on CSA 25 mg daily, so CSA dose was increased back to 25 mg po BID. Patient currently on 25 mg po BID, and doing well with this.  LFTs are within normal range, but are on upper limit of normal.  Therefore, will not taper CSA today.  Will have him continue on 25 mg po BID. 25 mg po BID and 25 QD alternatively (2/29/16); on CSA 25 mg daily  (3/2016); CSA 25 mg QOD (6/20/2016) started tapering off 9/21/2015; completed; D/C (8/8/16)  D/c fluc, D/c'd bactrim (give pentamidine today, last dose]  decreased acyclovir to 800 mg PO BID (from five times daily); D/C on 8/8/16    5. GI: see GVHD    - GI prophy: Protonix..  - h/o rectal abscess ' S/p recent surgery his pain is improved    6. FEN/Renal: WNL  - WNL    7. CV: Hypertension, he gained weight as well. On Amlodipin 10 mg daily , BP is high, I urged him to follow with his PCP nd lose some weight      RTC in 5  mos  Chapo Mann MD

## 2018-04-25 DIAGNOSIS — I10 ESSENTIAL HYPERTENSION, BENIGN: ICD-10-CM

## 2018-04-25 DIAGNOSIS — C92.01 ACUTE MYELOID LEUKEMIA IN REMISSION (H): ICD-10-CM

## 2018-04-25 RX ORDER — AMLODIPINE BESYLATE 5 MG/1
10 TABLET ORAL DAILY
Qty: 120 TABLET | Refills: 3 | Status: SHIPPED | OUTPATIENT
Start: 2018-04-25

## 2018-08-01 ENCOUNTER — APPOINTMENT (OUTPATIENT)
Dept: LAB | Facility: CLINIC | Age: 33
End: 2018-08-01
Attending: INTERNAL MEDICINE
Payer: MEDICARE

## 2018-08-01 ENCOUNTER — ONCOLOGY VISIT (OUTPATIENT)
Dept: TRANSPLANT | Facility: CLINIC | Age: 33
End: 2018-08-01
Attending: INTERNAL MEDICINE
Payer: MEDICARE

## 2018-08-01 VITALS
DIASTOLIC BLOOD PRESSURE: 104 MMHG | WEIGHT: 246.2 LBS | OXYGEN SATURATION: 93 % | HEART RATE: 98 BPM | BODY MASS INDEX: 37.44 KG/M2 | TEMPERATURE: 98.6 F | RESPIRATION RATE: 18 BRPM | SYSTOLIC BLOOD PRESSURE: 151 MMHG

## 2018-08-01 DIAGNOSIS — C92.01 ACUTE MYELOID LEUKEMIA IN REMISSION (H): ICD-10-CM

## 2018-08-01 LAB
ALBUMIN SERPL-MCNC: 3.8 G/DL (ref 3.4–5)
ALP SERPL-CCNC: 92 U/L (ref 40–150)
ALT SERPL W P-5'-P-CCNC: 87 U/L (ref 0–70)
ANION GAP SERPL CALCULATED.3IONS-SCNC: 9 MMOL/L (ref 3–14)
AST SERPL W P-5'-P-CCNC: 36 U/L (ref 0–45)
BASOPHILS # BLD AUTO: 0 10E9/L (ref 0–0.2)
BASOPHILS NFR BLD AUTO: 0.7 %
BILIRUB SERPL-MCNC: 0.6 MG/DL (ref 0.2–1.3)
BUN SERPL-MCNC: 12 MG/DL (ref 7–30)
CALCIUM SERPL-MCNC: 9 MG/DL (ref 8.5–10.1)
CHLORIDE SERPL-SCNC: 107 MMOL/L (ref 94–109)
CO2 SERPL-SCNC: 22 MMOL/L (ref 20–32)
CREAT SERPL-MCNC: 0.97 MG/DL (ref 0.66–1.25)
DIFFERENTIAL METHOD BLD: ABNORMAL
EOSINOPHIL # BLD AUTO: 0.2 10E9/L (ref 0–0.7)
EOSINOPHIL NFR BLD AUTO: 4.9 %
ERYTHROCYTE [DISTWIDTH] IN BLOOD BY AUTOMATED COUNT: 11.4 % (ref 10–15)
GFR SERPL CREATININE-BSD FRML MDRD: 89 ML/MIN/1.7M2
GLUCOSE SERPL-MCNC: 138 MG/DL (ref 70–99)
HCT VFR BLD AUTO: 46.3 % (ref 40–53)
HGB BLD-MCNC: 17.1 G/DL (ref 13.3–17.7)
IMM GRANULOCYTES # BLD: 0 10E9/L (ref 0–0.4)
IMM GRANULOCYTES NFR BLD: 0.2 %
LYMPHOCYTES # BLD AUTO: 1.9 10E9/L (ref 0.8–5.3)
LYMPHOCYTES NFR BLD AUTO: 43.5 %
MCH RBC QN AUTO: 33.5 PG (ref 26.5–33)
MCHC RBC AUTO-ENTMCNC: 36.9 G/DL (ref 31.5–36.5)
MCV RBC AUTO: 91 FL (ref 78–100)
MONOCYTES # BLD AUTO: 0.3 10E9/L (ref 0–1.3)
MONOCYTES NFR BLD AUTO: 7.7 %
NEUTROPHILS # BLD AUTO: 1.8 10E9/L (ref 1.6–8.3)
NEUTROPHILS NFR BLD AUTO: 43 %
NRBC # BLD AUTO: 0 10*3/UL
NRBC BLD AUTO-RTO: 0 /100
PLATELET # BLD AUTO: 248 10E9/L (ref 150–450)
POTASSIUM SERPL-SCNC: 3.4 MMOL/L (ref 3.4–5.3)
PROT SERPL-MCNC: 7.4 G/DL (ref 6.8–8.8)
RBC # BLD AUTO: 5.1 10E12/L (ref 4.4–5.9)
SODIUM SERPL-SCNC: 138 MMOL/L (ref 133–144)
WBC # BLD AUTO: 4.3 10E9/L (ref 4–11)

## 2018-08-01 PROCEDURE — 85025 COMPLETE CBC W/AUTO DIFF WBC: CPT | Performed by: INTERNAL MEDICINE

## 2018-08-01 PROCEDURE — 80053 COMPREHEN METABOLIC PANEL: CPT | Performed by: INTERNAL MEDICINE

## 2018-08-01 PROCEDURE — 36415 COLL VENOUS BLD VENIPUNCTURE: CPT

## 2018-08-01 ASSESSMENT — PAIN SCALES - GENERAL: PAINLEVEL: NO PAIN (0)

## 2018-08-01 NOTE — PROGRESS NOTES
BMT Clinic Progress Note    Mr. 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 Protocol 2001-02.     HPI:  No new symtpms, energy, oral intake are good      ROS: 10 point otherwise unremarkable other than what is noted in the HPI.     Physical Exam:  BP (!) 151/104  Pulse 98  Temp 98.6  F (37  C) (Oral)  Resp 18  Wt 111.7 kg (246 lb 3.2 oz)  SpO2 93%  BMI 37.44 kg/m2    General: A&O. NAD.  HEENT: ION. EOMI. Sclera anicteric; OP moist without lesions.  No erythema of posterior o/p.  No exudate.    Lungs: CTAB  Heart: RRR, no m/r/g  Abdomen: normoactive bs, soft, NT, ND  Extremities: no pedal edema, miled maculr rash R>L  Skin:  Maculopapular rash, in both forearms.   Neurologic: Grossly nonfocal    Labs:  Lab Results   Component Value Date    WBC 4.3 08/01/2018     Lab Results   Component Value Date    RBC 5.10 08/01/2018     Lab Results   Component Value Date    HGB 17.1 08/01/2018     Lab Results   Component Value Date    HCT 46.3 08/01/2018     No components found for: MCT  Lab Results   Component Value Date    MCV 91 08/01/2018     Lab Results   Component Value Date    MCH 33.5 08/01/2018     Lab Results   Component Value Date    MCHC 36.9 08/01/2018     Lab Results   Component Value Date    RDW 11.4 08/01/2018     Lab Results   Component Value Date     08/01/2018     Last Comprehensive Metabolic Panel:  Sodium   Date Value Ref Range Status   08/01/2018 138 133 - 144 mmol/L Final     Potassium   Date Value Ref Range Status   08/01/2018 3.4 3.4 - 5.3 mmol/L Final     Chloride   Date Value Ref Range Status   08/01/2018 107 94 - 109 mmol/L Final     Carbon Dioxide   Date Value Ref Range Status   08/01/2018 22 20 - 32 mmol/L Final     Anion Gap   Date Value Ref Range Status   08/01/2018 9 3 - 14 mmol/L Final     Glucose   Date Value Ref Range Status   08/01/2018 138 (H) 70 - 99 mg/dL Final     Urea Nitrogen   Date Value Ref Range Status   08/01/2018 12 7 - 30 mg/dL Final      Creatinine   Date Value Ref Range Status   08/01/2018 0.97 0.66 - 1.25 mg/dL Final     GFR Estimate   Date Value Ref Range Status   08/01/2018 89 >60 mL/min/1.7m2 Final     Comment:     Non  GFR Calc     Calcium   Date Value Ref Range Status   08/01/2018 9.0 8.5 - 10.1 mg/dL Final         1-YEAR ANNIVERSARY  FINAL DIAGNOSIS:  Bone marrow, posterior iliac crest, right decalcified trephine biopsy  and touch imprint; right direct aspirate smear, and concentrated  aspirate smear; and peripheral blood smear:  - No morphologic or immunophenotypic evidence of acute myeloid  leukemia (see comment)  - Hypocellular bone marrow for age (20-30%) with trilineage process  and no increase in blasts  - Peripheral blood showing slight leukopenia; slight neutropenia   INTERPRETATION:  Bone marrow, Right:  No increase in myeloid blasts and no abnormal myeloid blast  population    COMMENT:  Final interpretation requires correlation with morphology and other  ancillary studies.    RESULTS:  Percentages reported below are based on the total number of CD45  positive viable leukocytes. If applicable, percentage of plasma cells is  from total viable nucleated cells.  1% cells in the blast gate (CD45 dim and low side scatter blast gate).  There is no aberrant immunophenotype on the myeloid blasts.  0.7% CD34 positive blasts  100% DONOR IN BM  Assessment/Plan: Mr. Alejandro is a 29 yo man ,  s/p MA 8/8 allosib PBSCT for AML. Protocol 2001-02. 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    1. AML/BMT:   In CR, 100% donor PB)    2. HEME: Keep Hgb>8g/dL and plts>30K (h/o retinal hemorrhage and h/o epistaxis). Benadryl/tylenol premeds. Not needing transfusions    3. ID: Afebrile, no active infections. CMV (+), HSV (-), EBV (+). CMV neg (8/28).  - PPx: D/c fluconzole given LFTs upper limit of normal. Decrease acyclovir to 800 mg BID from five times daily. D/c bactrium, give pentamidine today.   CMV was negative on  1/4/2016.  Pentamidin compelted  immunisation complete, last one done (3rd in may 15 2017)  Influenza vaccine today    4. GVHD: No active GVH. Off immunosuppressive drugs.    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 No evidence viral inclusions, CMV stains neg.   - Started steroids on 6/4 and has now tapered off, last day 8/15/15.   -  started tapering off 9/21/2015; completed    [12/21/2015, LFT elevation with plt count drifting down while on CSA 25 mg daily, so CSA dose was increased back to 25 mg po BID. Patient currently on 25 mg po BID, and doing well with this.  LFTs are within normal range, but are on upper limit of normal.  Therefore, will not taper CSA today.  Will have him continue on 25 mg po BID. 25 mg po BID and 25 QD alternatively (2/29/16); on CSA 25 mg daily  (3/2016); CSA 25 mg QOD (6/20/2016) started tapering off 9/21/2015; completed; D/C (8/8/16)  D/c fluc, D/c'd bactrim (give pentamidine today, last dose]  decreased acyclovir to 800 mg PO BID (from five times daily); D/C on 8/8/16    5. GI: see GVHD    - GI prophy: Protonix..  - h/o rectal abscess ' S/p recent surgery his pain is improved    6. FEN/Renal: WNL  - WNL    7. CV: Hypertension, he gained weight as well. On Amlodipin 10 mg daily , BP is high, I urged him to follow with his PCP nd lose some weight      RTC in 5  mos  Chapo Mann MD

## 2018-08-01 NOTE — MR AVS SNAPSHOT
After Visit Summary   8/1/2018    Roel Alejandro    MRN: [id number]           Patient Information     Date Of Birth          1985        Visit Information        Provider Department      8/1/2018 10:00 AM  BMT DOM [facility] Blood [facility] Marrow Transplant        Today's Diagnoses     Acute myeloid leukemia in remission (H)              [facility] ([facility]  [address] [place] [address]  Phone: [phone]  Clinic Hours:   Monday-Thursday:7am to 7pm   Friday: 7am to 5pm   Weekends and holidays:    8am to noon (in general)  If your fever is 100.5  or greater,   call the clinic.  After hours call the   hospital at [phone] or   [phone]. Ask for the BMT   fellow on-call           Care Instructions    Waiting for re-assignment list from providers.    Anna  8/1/2018  10:32am            Follow-ups after your visit        Follow-up notes from your care team     Return in about 7 months (around 2/19/2019) for Routine Visit, Lab Work.      Who to contact     If you have questions or need follow up information about today's clinic visit or your schedule please contact [facility] directly at [phone].  Normal or non-critical lab and imaging results will be communicated to you by [employer], letter or phone within 4 business days after the clinic has received the results. If you do not hear from us within 7 days, please contact the clinic through [employer] or phone. If you have a critical or abnormal lab result, we will notify you by phone as soon as possible.  Submit refill requests through [employer] or call your pharmacy and they will forward the refill request to us. Please allow 3 business days for your refill to be completed.          Additional Information About Your Visit        MyChart Information     [employer] gives you secure access to your electronic health record. If you see a primary care provider, you can also send messages to your care  team and make appointments. If you have questions, please call your primary care clinic.  If you do not have a primary care provider, please call [phone] and they will assist you.        Care EveryWhere ID     This is your Care EveryWhere ID. This could be used by other organizations to access your [facility] medical records  [id number]        Your Vitals Were     Pulse Temperature Respirations Pulse Oximetry BMI (Body Mass Index)       98 98.6  F (37  C) (Oral) 18 93% 37.44 kg/m2        Blood Pressure from Last 3 Encounters:   08/01/18 (!) 151/104   01/08/18 (!) 142/101   09/25/17 134/84    Weight from Last 3 Encounters:   08/01/18 111.7 kg (246 lb 3.2 oz)   01/08/18 109.9 kg (242 lb 3.2 oz)   09/25/17 108.7 kg (239 lb 11.2 oz)              We Performed the Following     CBC with platelets differential     Comprehensive metabolic panel        Recent Review Flowsheet Data     BMT Recent Results Latest Ref Rng & Units 2/20/2017 3/15/2017 5/15/2017 7/10/2017 9/25/2017 1/8/2018 8/1/2018    WBC 4.0 - 11.0 10e9/L 5.6 4.8 4.8 4.8 5.0 5.5 4.3    Hemoglobin 13.3 - 17.7 g/dL 17.0 16.6 16.8 15.6 17.3 16.9 17.1    Platelet Count 150 - 450 10e9/L 326 258 247 232 267 302 248    Neutrophils (Absolute) 1.6 - 8.3 10e9/L 2.2 2.0 1.9 2.1 2.0 2.7 1.8    INR 0.86 - 1.14 - - - - - - -    Sodium 133 - 144 mmol/L 143 142 142 141 138 138 138    Potassium 3.4 - 5.3 mmol/L 3.8 3.7 3.7 3.8 3.6 3.4 3.4    Chloride 94 - 109 mmol/L 108 109 110(H) 110(H) 106 107 107    Glucose 70 - 99 mg/dL 99 102(H) 103(H) 96 129(H) 134(H) 138(H)    Urea Nitrogen 7 - 30 mg/dL 10 9 10 8 10 13 12    Creatinine 0.66 - 1.25 mg/dL 1.05 0.97 0.95 0.98 1.12 0.96 0.97    Calcium (Total) 8.5 - 10.1 mg/dL 9.4 9.0 9.2 8.8 9.2 9.0 9.0    Protein (Total) 6.8 - 8.8 g/dL 7.4 7.0 7.2 6.8 7.5 7.4 7.4    Albumin 3.4 - 5.0 g/dL 3.9 3.8 3.9 3.8 3.9 3.8 3.8    Bilirubin (Direct) 0.0 - 0.2 mg/dL - - - - - - -    Alkaline Phosphatase 40 - 150 U/L 78 69 73 69 84 76 92    AST 0 -  45 U/L 35 32 33 30 51(H) 37 36    ALT 0 - 70 U/L 100(H) 84(H) 75(H) 56 116(H) 88(H) 87(H)    MCV 78 - 100 fl 92 90 91 93 92 92 91               Primary Care Provider Office Phone # Fax #    Chapo MD Mackenzie [phone]       [address] [place] [address]  [facility] [address]        Equal Access to Services     KEEGAN ROBERSON : Hadii aad ku hadasho Soomaali, waaxda luqadaha, qaybta kaalmada adeegyada, waxay idiin hayaan adeeg kharash la'aan . So [facility] [phone].    ATENCIÓN: Si habla español, tiene a mendoza disposición servicios gratuitos de asistencia lingüística. [facility] [phone].    We comply with applicable federal civil rights laws and [place] laws. We do not discriminate on the basis of race, color, national origin, age, disability, sex, sexual orientation, or gender identity.            Thank you!     Thank you for choosing [facility]  for your care. Our goal is always to provide you with excellent care. Hearing back from our patients is one way we can continue to improve our services. Please take a few minutes to complete the written survey that you may receive in the mail after your visit with us. Thank you!             Your Updated Medication List - Protect others around you: Learn how to safely use, store and throw away your medicines at www.disposemymeds.org.          This list is accurate as of 8/1/18 11:59 PM.  Always use your most recent med list.                   Brand Name Dispense Instructions for use Diagnosis    amLODIPine 5 MG tablet    NORVASC    120 tablet    Take 2 tablets (10 mg) by mouth daily    Acute myeloid leukemia in remission (H), Essential hypertension, benign

## 2018-08-01 NOTE — NURSING NOTE
Chief Complaint   Patient presents with     Blood Draw     VPT blood draw and vitals     Venipuncture labs drawn from right arm

## 2018-08-01 NOTE — NURSING NOTE
"Oncology Rooming Note    August 1, 2018 9:54 AM   Roel Alejandro is a 32 year old male who presents for:    Chief Complaint   Patient presents with     Blood Draw     VPT blood draw and vitals     RECHECK     Pt is here for labs and to see MD for AML     Initial Vitals: BP (!) 151/104  Pulse 98  Temp 98.6  F (37  C) (Oral)  Resp 18  Wt 111.7 kg (246 lb 3.2 oz)  SpO2 93%  BMI 37.44 kg/m2 Estimated body mass index is 37.44 kg/(m^2) as calculated from the following:    Height as of 1/8/18: 1.727 m (5' 7.99\").    Weight as of this encounter: 111.7 kg (246 lb 3.2 oz). Body surface area is 2.31 meters squared.  No Pain (0) Comment: Data Unavailable   No LMP for male patient.  Allergies reviewed: Yes  Medications reviewed: Yes    Medications: Medication refills not needed today.  Pharmacy name entered into [facility]:    [facility] - [place], [place] - [address]  [facility] [address] [place] [facility] - [place], [place] [address] [place] [address] [address]  [id number] - [place], [place] - [address]  [facility] [address] [facility] [address]    Clinical concerns: none     6 minutes for nursing intake (face to face time)     Julieta Richter MA              "

## 2018-12-03 ENCOUNTER — TELEPHONE (OUTPATIENT)
Dept: TRANSPLANT | Facility: CLINIC | Age: 33
End: 2018-12-03

## 2018-12-03 NOTE — TELEPHONE ENCOUNTER
Called pt/pt's mother to let pt know MD's template is changing. Reason for call: see if pt can see MD at 530 instead of 3pm. Left VM w/ CB #

## 2020-03-10 ENCOUNTER — HEALTH MAINTENANCE LETTER (OUTPATIENT)
Age: 35
End: 2020-03-10

## 2020-12-20 ENCOUNTER — HEALTH MAINTENANCE LETTER (OUTPATIENT)
Age: 35
End: 2020-12-20

## 2021-04-24 ENCOUNTER — HEALTH MAINTENANCE LETTER (OUTPATIENT)
Age: 36
End: 2021-04-24

## 2021-10-03 ENCOUNTER — HEALTH MAINTENANCE LETTER (OUTPATIENT)
Age: 36
End: 2021-10-03

## 2022-05-15 ENCOUNTER — HEALTH MAINTENANCE LETTER (OUTPATIENT)
Age: 37
End: 2022-05-15

## 2022-09-11 ENCOUNTER — HEALTH MAINTENANCE LETTER (OUTPATIENT)
Age: 37
End: 2022-09-11

## 2023-06-03 ENCOUNTER — HEALTH MAINTENANCE LETTER (OUTPATIENT)
Age: 38
End: 2023-06-03

## 2023-06-19 ENCOUNTER — TRANSFERRED RECORDS (OUTPATIENT)
Dept: HEALTH INFORMATION MANAGEMENT | Facility: CLINIC | Age: 38
End: 2023-06-19
Payer: MEDICARE

## 2023-07-05 ENCOUNTER — TRANSFERRED RECORDS (OUTPATIENT)
Dept: HEALTH INFORMATION MANAGEMENT | Facility: CLINIC | Age: 38
End: 2023-07-05
Payer: MEDICARE

## 2025-01-11 ENCOUNTER — HOSPITAL ENCOUNTER (EMERGENCY)
Facility: HOSPITAL | Age: 40
Discharge: HOME | End: 2025-01-12
Attending: EMERGENCY MEDICINE | Admitting: EMERGENCY MEDICINE
Payer: COMMERCIAL

## 2025-01-11 ENCOUNTER — APPOINTMENT (OUTPATIENT)
Dept: RADIOLOGY | Facility: HOSPITAL | Age: 40
End: 2025-01-11
Attending: EMERGENCY MEDICINE
Payer: COMMERCIAL

## 2025-01-11 VITALS
OXYGEN SATURATION: 95 % | RESPIRATION RATE: 20 BRPM | HEART RATE: 72 BPM | BODY MASS INDEX: 36.37 KG/M2 | WEIGHT: 240 LBS | HEIGHT: 68 IN | TEMPERATURE: 98.5 F | SYSTOLIC BLOOD PRESSURE: 132 MMHG | DIASTOLIC BLOOD PRESSURE: 95 MMHG

## 2025-01-11 DIAGNOSIS — R07.9 CHEST PAIN, UNSPECIFIED TYPE: ICD-10-CM

## 2025-01-11 LAB
ANION GAP SERPL CALCULATED.3IONS-SCNC: 12 MMOL/L (ref 7–15)
BASOPHILS # BLD AUTO: 0 10E3/UL (ref 0–0.2)
BASOPHILS NFR BLD AUTO: 0 %
BUN SERPL-MCNC: 16.8 MG/DL (ref 6–20)
CALCIUM SERPL-MCNC: 9.7 MG/DL (ref 8.8–10.4)
CHLORIDE SERPL-SCNC: 103 MMOL/L (ref 98–107)
CREAT SERPL-MCNC: 1.07 MG/DL (ref 0.67–1.17)
EGFRCR SERPLBLD CKD-EPI 2021: >90 ML/MIN/1.73M2
EOSINOPHIL # BLD AUTO: 0.2 10E3/UL (ref 0–0.7)
EOSINOPHIL NFR BLD AUTO: 2 %
ERYTHROCYTE [DISTWIDTH] IN BLOOD BY AUTOMATED COUNT: 11.9 % (ref 10–15)
GLUCOSE SERPL-MCNC: 96 MG/DL (ref 70–99)
HCO3 SERPL-SCNC: 26 MMOL/L (ref 22–29)
HCT VFR BLD AUTO: 45.7 % (ref 40–53)
HGB BLD-MCNC: 16.5 G/DL (ref 13.3–17.7)
IMM GRANULOCYTES # BLD: 0 10E3/UL
IMM GRANULOCYTES NFR BLD: 0 %
LYMPHOCYTES # BLD AUTO: 2.9 10E3/UL (ref 0.8–5.3)
LYMPHOCYTES NFR BLD AUTO: 29 %
MAGNESIUM SERPL-MCNC: 2 MG/DL (ref 1.7–2.3)
MCH RBC QN AUTO: 34.4 PG (ref 26.5–33)
MCHC RBC AUTO-ENTMCNC: 36.1 G/DL (ref 31.5–36.5)
MCV RBC AUTO: 95 FL (ref 78–100)
MONOCYTES # BLD AUTO: 0.8 10E3/UL (ref 0–1.3)
MONOCYTES NFR BLD AUTO: 8 %
NEUTROPHILS # BLD AUTO: 6 10E3/UL (ref 1.6–8.3)
NEUTROPHILS NFR BLD AUTO: 61 %
NRBC # BLD AUTO: 0 10E3/UL
NRBC BLD AUTO-RTO: 0 /100
PLATELET # BLD AUTO: 263 10E3/UL (ref 150–450)
POTASSIUM SERPL-SCNC: 3.9 MMOL/L (ref 3.4–5.3)
RBC # BLD AUTO: 4.8 10E6/UL (ref 4.4–5.9)
SODIUM SERPL-SCNC: 141 MMOL/L (ref 135–145)
TROPONIN T SERPL HS-MCNC: 7 NG/L
TROPONIN T SERPL HS-MCNC: 7 NG/L
WBC # BLD AUTO: 9.9 10E3/UL (ref 4–11)

## 2025-01-11 PROCEDURE — 250N000013 HC RX MED GY IP 250 OP 250 PS 637: Performed by: EMERGENCY MEDICINE

## 2025-01-11 PROCEDURE — 85025 COMPLETE CBC W/AUTO DIFF WBC: CPT | Performed by: EMERGENCY MEDICINE

## 2025-01-11 PROCEDURE — 93005 ELECTROCARDIOGRAM TRACING: CPT | Performed by: EMERGENCY MEDICINE

## 2025-01-11 PROCEDURE — 82435 ASSAY OF BLOOD CHLORIDE: CPT | Performed by: EMERGENCY MEDICINE

## 2025-01-11 PROCEDURE — 83735 ASSAY OF MAGNESIUM: CPT | Performed by: EMERGENCY MEDICINE

## 2025-01-11 PROCEDURE — 84484 ASSAY OF TROPONIN QUANT: CPT | Performed by: EMERGENCY MEDICINE

## 2025-01-11 PROCEDURE — 71046 X-RAY EXAM CHEST 2 VIEWS: CPT

## 2025-01-11 PROCEDURE — 36415 COLL VENOUS BLD VENIPUNCTURE: CPT | Performed by: EMERGENCY MEDICINE

## 2025-01-11 PROCEDURE — 82374 ASSAY BLOOD CARBON DIOXIDE: CPT | Performed by: EMERGENCY MEDICINE

## 2025-01-11 PROCEDURE — 99285 EMERGENCY DEPT VISIT HI MDM: CPT | Mod: 25

## 2025-01-11 PROCEDURE — 93005 ELECTROCARDIOGRAM TRACING: CPT | Performed by: STUDENT IN AN ORGANIZED HEALTH CARE EDUCATION/TRAINING PROGRAM

## 2025-01-11 RX ORDER — ACETAMINOPHEN 325 MG/1
975 TABLET ORAL ONCE
Status: COMPLETED | OUTPATIENT
Start: 2025-01-11 | End: 2025-01-11

## 2025-01-11 RX ORDER — IBUPROFEN 200 MG
400 TABLET ORAL ONCE
Status: COMPLETED | OUTPATIENT
Start: 2025-01-11 | End: 2025-01-11

## 2025-01-11 RX ADMIN — ACETAMINOPHEN 975 MG: 325 TABLET ORAL at 20:14

## 2025-01-11 RX ADMIN — IBUPROFEN 400 MG: 200 TABLET, FILM COATED ORAL at 20:14

## 2025-01-11 ASSESSMENT — ACTIVITIES OF DAILY LIVING (ADL)
ADLS_ACUITY_SCORE: 41

## 2025-01-11 ASSESSMENT — COLUMBIA-SUICIDE SEVERITY RATING SCALE - C-SSRS
1. IN THE PAST MONTH, HAVE YOU WISHED YOU WERE DEAD OR WISHED YOU COULD GO TO SLEEP AND NOT WAKE UP?: NO
2. HAVE YOU ACTUALLY HAD ANY THOUGHTS OF KILLING YOURSELF IN THE PAST MONTH?: NO
6. HAVE YOU EVER DONE ANYTHING, STARTED TO DO ANYTHING, OR PREPARED TO DO ANYTHING TO END YOUR LIFE?: NO

## 2025-01-12 NOTE — ED NOTES
"EMERGENCY DEPARTMENT SIGN OUT NOTE        ED COURSE AND MEDICAL DECISION MAKING  Patient was signed out to me by Dr Ольга Singh at 10:27 PM    In brief, Roel Alejandro is a 39 year old male who initially presented with chest pain     \"Roel Alejandro is a 39 year old male who presents for evaluation of chest pain that started an hour prior to arrival with some associated lightheadedness that lasted about 30 seconds.\"    At time of sign out, disposition was pending delta troponin    11:26 PM repeat troponin unchanged, will discharge at this time with return precautions.    FINAL IMPRESSION    1. Chest pain, unspecified type        ED MEDS  Medications   acetaminophen (TYLENOL) tablet 975 mg (975 mg Oral $Given 1/11/25 2014)   ibuprofen (ADVIL/MOTRIN) tablet 400 mg (400 mg Oral $Given 1/11/25 2014)       LAB  Labs Ordered and Resulted from Time of ED Arrival to Time of ED Departure   CBC WITH PLATELETS AND DIFFERENTIAL - Abnormal       Result Value    WBC Count 9.9      RBC Count 4.80      Hemoglobin 16.5      Hematocrit 45.7      MCV 95      MCH 34.4 (*)     MCHC 36.1      RDW 11.9      Platelet Count 263      % Neutrophils 61      % Lymphocytes 29      % Monocytes 8      % Eosinophils 2      % Basophils 0      % Immature Granulocytes 0      NRBCs per 100 WBC 0      Absolute Neutrophils 6.0      Absolute Lymphocytes 2.9      Absolute Monocytes 0.8      Absolute Eosinophils 0.2      Absolute Basophils 0.0      Absolute Immature Granulocytes 0.0      Absolute NRBCs 0.0     BASIC METABOLIC PANEL - Normal    Sodium 141      Potassium 3.9      Chloride 103      Carbon Dioxide (CO2) 26      Anion Gap 12      Urea Nitrogen 16.8      Creatinine 1.07      GFR Estimate >90      Calcium 9.7      Glucose 96     TROPONIN T, HIGH SENSITIVITY - Normal    Troponin T, High Sensitivity 7     MAGNESIUM - Normal    Magnesium 2.0     TROPONIN T, HIGH SENSITIVITY - Normal    Troponin T, High Sensitivity 7         EKG  None during " my shift    RADIOLOGY    XR Chest 2 Views   Final Result   IMPRESSION: Negative chest.          DISCHARGE MEDS  New Prescriptions    No medications on file       Messi Carney MD  [facility] EMERGENCY DEPARTMENT  [facility] [facility] [id number] [phone]         Messi Carney MD  01/11/25 [id number]

## 2025-01-12 NOTE — ED NOTES
Patient discharged at 2359 to home; family providing transportation. Patient provided with all discharge paperwork and educational material. All questions answered to patient's satisfaction.

## 2025-01-12 NOTE — DISCHARGE INSTRUCTIONS
You were seen in the Emergency Department today for evaluation of chest pain.  Your lab work showed no cause of your symptoms. Your imaging studies showed no significant cause of your symptoms.  Keep your stress test scheduled on Tuesday as planned.  Follow up with your primary care physician to ensure resolution of symptoms. Return if you have new or worsening symptoms.

## 2025-01-12 NOTE — ED TRIAGE NOTES
Patient has been having intermittent chest pains for a couple of months--has seen his primary and is scheduled for a stress test on Tuesday--tonight he was sitting and talking when he developed the pain but also felt light headed, like he was going to pass out--this is a new symptom which brought him in--no lightheadedness now but is having chest pain.

## 2025-01-12 NOTE — ED PROVIDER NOTES
EMERGENCY DEPARTMENT ENCOUNTER      NAME: Roel Alejandro  AGE: 39 year old male  YOB: 1985  MRN: [id number]  EVALUATION DATE & TIME: 1/11/2025  6:45 PM    PCP: [facility]    ED PROVIDER: Ольга Singh M.D.      Chief Complaint   Patient presents with    Chest Pain     FINAL IMPRESSION:  1. Chest pain, unspecified type        ED COURSE & MEDICAL DECISION MAKING:    Pertinent Labs & Imaging studies reviewed. (See chart for details)  ED Course as of 01/12/25 2230   Sat Jan 11, 2025 1954 Neftali is a very pleasant 39-year-old male comes in today for evaluation of chest pain has been going on for since about 1 hour prior to arrival.  He has similar pain in the past and is scheduled to have a stress test on Tuesday.  He was just standing around making sausage and developed some chest pain.  He has had similar episodes.  That is why they ordered the stress test for him.  Today he got a little dizziness that lasted about 30 seconds after the chest pain and going on for about an hour.  That is since resolved.  People at work with him said he was lightheaded pale appearing and he reported being lightheaded.  He reported that his tongue was a little numb and tingling.  That is resolved as well.  He had no nausea or vomiting or diaphoresis.  He has a positive family history and is a smoker.  He has no diabetes or cholesterol but does take blood pressure medications.  He reports the chest pain is better but not resolved.  He did take aspirin at home.  I will give him some Tylenol here for pain and will check some basic labs and a chest x-ray to further evaluate.  I discussed all this with him and he is comfortable with the plan.   2122 Initial troponin was 7.  Will get a repeat troponin here shortly once it has been 2 hours since the initial draw and then can reevaluate the patient.  If he is doing well and troponin stays negative then I think he can discharge with the outpatient  plan.   2138 I updated the patient on the findings and plan for repeat troponin and likely discharge home if that looks okay.  He is comfortable with that plan.   2251 Patient was signed out at change of shift to Dr. Carney pending repeat troponin and disposition.  I anticipate that if the troponin is unchanged patient can discharge home.       Medical Decision Making    History:  Supplemental history from: None  External Record(s) reviewed: I reviewed the patient's clinic visit from 1/3/2025.  At that time he was seeing family medicine and was still having chest pain with radiation to his left arm.  They recommended a stress echocardiogram and consider cardiology referral if that test was abnormal.  He has hypertension and uncontrolled blood pressure still with goal of 130/80.  He was can continue amlodipine and Hyzaar and add metoprolol XL 50 mg daily.  They recommend continue to try to quit smoking    Work Up:  Emergent/Severe conditions considered and evaluated for: Electrolyte abnormality, hyperglycemia, hypoglycemia, acidosis, anemia, thrombocytopenia, renal failure, dehydration, ACS, pericarditis, myocarditis, pneumothorax, pneumonia, esophageal rupture  I independently reviewed and interpreted EKG and chest x-ray which showed no pneumothorax. See full radiology report for all details. Rhythm strip shows sinus rhythm at 71 bpm.  In addition to work up documented, I considered the following work up: None  Medications given that require intensive monitoring for toxicity: None    External consultation:  Discussion of management with another provider: None    Complicating factors:  Care impacted by chronic illness: Hypertension    Disposition considerations: Considered admission to the hospital but work up came back reassuring and patient was able to discharge home.    Prescriptions considered/prescribed: None        At the conclusion of the encounter I discussed  the results of all of the tests and the  disposition with patient.   All questions were answered.  The patient acknowledged understanding and was involved in the decision making regarding the overall care plan.      I discussed with patient the utility, limitations and findings of the exam/interventions/studies done during this visit as well as the list of differential diagnosis and symptoms to monitor/return to ER for.  Additional verbal discharge instructions were provided.     MEDICATIONS GIVEN IN THE EMERGENCY:  Medications   acetaminophen (TYLENOL) tablet 975 mg (975 mg Oral $Given 1/11/25 2014)   ibuprofen (ADVIL/MOTRIN) tablet 400 mg (400 mg Oral $Given 1/11/25 2014)       NEW PRESCRIPTIONS STARTED AT TODAY'S ER VISIT  New Prescriptions    No medications on file          =================================================================    HPI    Triage Note: Patient has been having intermittent chest pains for a couple of months--has seen his primary and is scheduled for a stress test on Tuesday--tonight he was sitting and talking when he developed the pain but also felt light headed, like he was going to pass out--this is a new symptom which brought him in--no lightheadedness now but is having chest pain.     Use of : None      Roel Alejandro is a 39 year old male who presents for evaluation of chest pain that started an hour prior to arrival with some associated lightheadedness that lasted about 30 seconds.    PAST MEDICAL HISTORY:  Past Medical History:   Diagnosis Date    AML (acute myeloblastic leukaemia) 11/14    Celiac disease     Hypertension     Obesity     Rectal abscess     Transplant     BMT       PAST SURGICAL HISTORY:  Past Surgical History:   Procedure Laterality Date    ESOPHAGOSCOPY, GASTROSCOPY, DUODENOSCOPY (EGD), COMBINED Left 6/4/2015    Procedure: COMBINED ESOPHAGOSCOPY, GASTROSCOPY, DUODENOSCOPY (EGD), BIOPSY SINGLE OR MULTIPLE;  Surgeon: Roel Norman MD;  Location: [facility] GI    EXAM UNDER ANESTHESIA, FISTULOTOMY  RECTUM, COMBINED N/A 7/21/2016    Procedure: COMBINED EXAM UNDER ANESTHESIA, FISTULOTOMY RECTUM;  Surgeon: Ginette Rob MD;  Location: [facility] OR    Mendez catheter placement Left November 2014    left IJ vein 10 Fr. DL silastic Mendez    IR CVC TUNNEL W2 CATH W/O PORT  11/21/2014    IR LUMBAR PUNCTURE  12/5/2014    IR LUMBAR PUNCTURE  1/16/2015    IR LUMBAR PUNCTURE  2/6/2015    IR LUMBAR PUNCTURE  2/13/2015       CURRENT MEDICATIONS:    No current facility-administered medications for this encounter.    Current Outpatient Medications:     amLODIPine (NORVASC) 5 MG tablet, Take 2 tablets (10 mg) by mouth daily, Disp: 120 tablet, Rfl: 3    ALLERGIES:  Allergies   Allergen Reactions    Gluten Meal      Headaches and diarrhea    Meropenem Rash       FAMILY HISTORY:  Family History   Problem Relation Age of Onset    Hypertension Mother     Coronary Artery Disease Father     Hypertension Father     Other Cancer Father         testicular    Anesthesia Reaction No family hx of     Colon Polyps No family hx of     Crohn's Disease No family hx of     Ulcerative Colitis No family hx of     Colon Cancer Paternal Grandfather 55    Other Cancer Other         Leukemia     Colon Cancer Maternal Grandmother 84       SOCIAL HISTORY:   Social History     Socioeconomic History    Marital status: Single   Tobacco Use    Smoking status: Former     Current packs/day: 0.00     Average packs/day: 1 pack/day for 7.0 years (7.0 ttl pk-yrs)     Types: Cigarettes     Start date: 9/15/2007     Quit date: 9/15/2014     Years since quitting: 10.3   Substance and Sexual Activity    Alcohol use: No     Alcohol/week: 0.0 standard drinks of alcohol     Comment: social    Drug use: No   Other Topics Concern    Exercise No   Social History Narrative    Patient is single and currently lives at home with parents.      Social Drivers of Health     Financial Resource Strain: Not At Risk (1/19/2024)    Received from [facility]  "Strain     Is it hard for you to pay for the very basics like food, housing, medical care or heating?: No   Food Insecurity: Not At Risk (1/19/2024)    Received from [facility]    Food Insecurity     Does your food run out before you have the money to buy more?: No   Transportation Needs: Not At Risk (1/19/2024)    Received from [facility]    Transportation Needs     Does a lack of transportation keep you from your medical appointments or from getting your medications?: No   Interpersonal Safety: Unknown (5/1/2024)    Received from [facility]    Humiliation, Afraid, Rape, and Kick questionnaire     Physically Abused: No     Sexually Abused: No       PHYSICAL EXAM    VITAL SIGNS: /72   Pulse 71   Temp 98.5  F (36.9  C) (Oral)   Resp 22   Ht 1.727 m (5' 8\")   Wt 108.9 kg (240 lb)   SpO2 97%   BMI 36.49 kg/m     GENERAL: Awake, alert, answering questions appropriately, no acute distress  SPEECH:  Easy to understand speech, Normal volume and sneha  PULMONARY: No respiratory distress, Lungs clear to auscultation bilaterally  CARDIOVASCULAR: Regular rate and rhythm, Distal pulses present and normal.  ABDOMINAL: Soft, Nondistended, Nontender, No rebound or guarding, No palpable masses  EXTREMITIES: No lower extremity edema.  PSYCH: Normal mood and affect     LAB:  All pertinent labs reviewed and interpreted.  Results for orders placed or performed during the hospital encounter of 01/11/25   XR Chest 2 Views    Impression    IMPRESSION: Negative chest.   Basic metabolic panel   Result Value Ref Range    Sodium 141 135 - 145 mmol/L    Potassium 3.9 3.4 - 5.3 mmol/L    Chloride 103 98 - 107 mmol/L    Carbon Dioxide (CO2) 26 22 - 29 mmol/L    Anion Gap 12 7 - 15 mmol/L    Urea Nitrogen 16.8 6.0 - 20.0 mg/dL    Creatinine 1.07 0.67 - 1.17 mg/dL    GFR Estimate >90 >60 mL/min/1.73m2    Calcium 9.7 8.8 - 10.4 mg/dL    Glucose 96 70 - 99 mg/dL   Result Value Ref Range    Troponin T, High Sensitivity 7 " <=22 ng/L   Result Value Ref Range    Magnesium 2.0 1.7 - 2.3 mg/dL   CBC with platelets and differential   Result Value Ref Range    WBC Count 9.9 4.0 - 11.0 10e3/uL    RBC Count 4.80 4.40 - 5.90 10e6/uL    Hemoglobin 16.5 13.3 - 17.7 g/dL    Hematocrit 45.7 40.0 - 53.0 %    MCV 95 78 - 100 fL    MCH 34.4 (H) 26.5 - 33.0 pg    MCHC 36.1 31.5 - 36.5 g/dL    RDW 11.9 10.0 - 15.0 %    Platelet Count 263 150 - 450 10e3/uL    % Neutrophils 61 %    % Lymphocytes 29 %    % Monocytes 8 %    % Eosinophils 2 %    % Basophils 0 %    % Immature Granulocytes 0 %    NRBCs per 100 WBC 0 <1 /100    Absolute Neutrophils 6.0 1.6 - 8.3 10e3/uL    Absolute Lymphocytes 2.9 0.8 - 5.3 10e3/uL    Absolute Monocytes 0.8 0.0 - 1.3 10e3/uL    Absolute Eosinophils 0.2 0.0 - 0.7 10e3/uL    Absolute Basophils 0.0 0.0 - 0.2 10e3/uL    Absolute Immature Granulocytes 0.0 <=0.4 10e3/uL    Absolute NRBCs 0.0 10e3/uL       RADIOLOGY:  XR Chest 2 Views   Final Result   IMPRESSION: Negative chest.            EKG:    Date and time: January 11, 2025 at 1822  Rate: 90 bpm  Rhythm: Sinus rhythm  MI interval: 156 ms  QRS interval: 94 ms  QT/QTc: 390/477 ms  ST changes or T wave changes: No acute ST or T wave abnormality  Change from prior ECG: No significant change from prior  I have independently reviewed and interpreted this EKG.     Ольга Singh M.D.  Emergency Medicine  [facility] EMERGENCY DEPARTMENT  [address] [address] [phone]  Dept: [phone]       Ольга Singh MD  01/12/25 4206

## 2025-01-23 LAB
ATRIAL RATE - MUSE: 90 BPM
DIASTOLIC BLOOD PRESSURE - MUSE: NORMAL MMHG
INTERPRETATION ECG - MUSE: NORMAL
P AXIS - MUSE: 56 DEGREES
PR INTERVAL - MUSE: 156 MS
QRS DURATION - MUSE: 94 MS
QT - MUSE: 390 MS
QTC - MUSE: 477 MS
R AXIS - MUSE: 43 DEGREES
SYSTOLIC BLOOD PRESSURE - MUSE: NORMAL MMHG
T AXIS - MUSE: 35 DEGREES
VENTRICULAR RATE- MUSE: 90 BPM

## 2025-02-09 ENCOUNTER — HEALTH MAINTENANCE LETTER (OUTPATIENT)
Age: 40
End: 2025-02-09